# Patient Record
Sex: MALE | Race: OTHER | NOT HISPANIC OR LATINO | URBAN - METROPOLITAN AREA
[De-identification: names, ages, dates, MRNs, and addresses within clinical notes are randomized per-mention and may not be internally consistent; named-entity substitution may affect disease eponyms.]

---

## 2018-12-17 ENCOUNTER — INPATIENT (INPATIENT)
Facility: HOSPITAL | Age: 83
LOS: 0 days | Discharge: LEFT BEFORE DISCHARGE | DRG: 204 | End: 2018-12-17
Attending: INTERNAL MEDICINE | Admitting: INTERNAL MEDICINE
Payer: MEDICARE

## 2018-12-17 VITALS
OXYGEN SATURATION: 96 % | HEART RATE: 49 BPM | SYSTOLIC BLOOD PRESSURE: 154 MMHG | TEMPERATURE: 98 F | DIASTOLIC BLOOD PRESSURE: 67 MMHG | RESPIRATION RATE: 19 BRPM

## 2018-12-17 VITALS
SYSTOLIC BLOOD PRESSURE: 194 MMHG | WEIGHT: 143.08 LBS | HEART RATE: 57 BPM | RESPIRATION RATE: 19 BRPM | TEMPERATURE: 98 F | OXYGEN SATURATION: 95 % | DIASTOLIC BLOOD PRESSURE: 68 MMHG

## 2018-12-17 PROCEDURE — 99284 EMERGENCY DEPT VISIT MOD MDM: CPT | Mod: 25

## 2018-12-17 PROCEDURE — 71275 CT ANGIOGRAPHY CHEST: CPT

## 2018-12-17 PROCEDURE — 85610 PROTHROMBIN TIME: CPT

## 2018-12-17 PROCEDURE — 84484 ASSAY OF TROPONIN QUANT: CPT

## 2018-12-17 PROCEDURE — 36415 COLL VENOUS BLD VENIPUNCTURE: CPT

## 2018-12-17 PROCEDURE — 80053 COMPREHEN METABOLIC PANEL: CPT

## 2018-12-17 PROCEDURE — 71046 X-RAY EXAM CHEST 2 VIEWS: CPT

## 2018-12-17 PROCEDURE — 71046 X-RAY EXAM CHEST 2 VIEWS: CPT | Mod: 26

## 2018-12-17 PROCEDURE — 85730 THROMBOPLASTIN TIME PARTIAL: CPT

## 2018-12-17 PROCEDURE — 99285 EMERGENCY DEPT VISIT HI MDM: CPT | Mod: 25

## 2018-12-17 PROCEDURE — 83880 ASSAY OF NATRIURETIC PEPTIDE: CPT

## 2018-12-17 PROCEDURE — 82553 CREATINE MB FRACTION: CPT

## 2018-12-17 PROCEDURE — 85025 COMPLETE CBC W/AUTO DIFF WBC: CPT

## 2018-12-17 PROCEDURE — 82550 ASSAY OF CK (CPK): CPT

## 2018-12-17 PROCEDURE — 71275 CT ANGIOGRAPHY CHEST: CPT | Mod: 26

## 2018-12-17 NOTE — ED PROVIDER NOTE - MEDICAL DECISION MAKING DETAILS
Pt c/o progressive kingston, poss pnd, + bilat le edema x years, worse lately w minimal exertion.  No murmur on exam.  No med care x years so unclear if pt has cardiac vs pulm etiology of sx.  Plan labs, cxr, cta for pe since h/o skin ca, reassess.

## 2018-12-17 NOTE — ED PROVIDER NOTE - PROGRESS NOTE DETAILS
Trop neg, bnp elevated, cxr neg on my read, prelim cta neg for pe per verbal report.  Pt discussed w Dr Nicholson - will admit to tele for kingston eval. CTA neg for pe and + for copd.  Pt unwilling to stay - understands risk of undiagnosed cardiac or pulm issues.  Patient desires to leave emergency department against medical advice, prior to completion of my desired evaluation and treatment plan.  Patient's mental status is normal, patient is fully alert and oriented x person, place and time.  Patient demonstrates clear reasoning capabilities and capacity to make this decision.  Patient fully understands the explained risks involved with this decision including worsening of medical condition, missed and or delayed diagnosis, permanent disability and death.  All alternative options given to patient and still desires discharge against medical advice from ED and will follow up as an outpatient.  Patient given the option to return to ED at any time to have further evaluation and treatment. Referrals coordinator taking his info to help arrange pmd, cardiac and pulm outpt care.

## 2018-12-17 NOTE — ED PROVIDER NOTE - OBJECTIVE STATEMENT
84 yo male w/o past medical care and unknown pmh c/o progressive kingston w minimal exertion now causing sob.  No cp, palpitations.  + pnd, no orthopnea.  + le edema x years - no sig change lately.  No recent travel, h/o pe/dvt.  No cardiac or pulm eval. + tob history.  No cough, uri sx, fever, chills.

## 2018-12-17 NOTE — ED PROVIDER NOTE - DIAGNOSTIC INTERPRETATION
ER Physician:  Debbi Director  CHEST XRAY INTERPRETATION: lungs clear, heart shadow normal, bony structures intact

## 2018-12-17 NOTE — ED ADULT TRIAGE NOTE - CHIEF COMPLAINT QUOTE
worsening shortness of breath, cough, chest pressure x few weeks.  Sent by Ariana Schuler NP ([phone]) worsening shortness of breath, cough, chest pressure x few weeks.  Sent by Ariana Schuler NP ([phone]).  Hx skin cancer

## 2018-12-17 NOTE — ED ADULT NURSE NOTE - CHIEF COMPLAINT QUOTE
worsening shortness of breath, cough, chest pressure x few weeks.  Sent by Ariana Schuler NP ([phone]).  Hx skin cancer

## 2018-12-17 NOTE — ED ADULT NURSE NOTE - NSIMPLEMENTINTERV_GEN_ALL_ED
Implemented All Universal Safety Interventions:  [place] to call system. Call bell, personal items and telephone within reach. Instruct patient to call for assistance. Room bathroom lighting operational. Non-slip footwear when patient is off stretcher. Physically safe environment: no spills, clutter or unnecessary equipment. Stretcher in lowest position, wheels locked, appropriate side rails in place.

## 2018-12-17 NOTE — ED PROVIDER NOTE - NSFOLLOWUPINSTRUCTIONS_ED_ALL_ED_FT
Exertional Shortness of Breath    Please follow up with a PMD, cardiologist and pulmonologist.  Return for increased difficulty breathing, chest pain, palpitations, any other concerns.    Shortness of breath    Shortness of breath (dyspnea) means you have trouble breathing and could indicate a medical problem. Causes include lung disease, heart disease, low amount of red blood cells (anemia), poor physical fitness, being overweight, smoking, etc. Your health care provider today may not be able to find a cause for your shortness of breath after your exam. In this case, it is important to have a follow-up exam with your primary care physician as instructed. If medicines were prescribed, take them as directed for the full length of time directed. Refrain from tobacco products.    SEEK IMMEDIATE MEDICAL CARE IF YOU HAVE ANY OF THE FOLLOWING SYMPTOMS: worsening shortness of breath, chest pain, back pain, abdominal pain, fever, coughing up blood, lightheadedness/dizziness.

## 2018-12-17 NOTE — ED ADULT NURSE NOTE - OBJECTIVE STATEMENT
c.o cough with clear sputum and worsening sob for 3 years. denies any chest pain, fever/chills, dizziness. states he is only able to walk 3 blocks before feeling sob. denies any sob at this time. speaking in clear, complete sentences

## 2018-12-17 NOTE — ED PROVIDER NOTE - MUSCULOSKELETAL, MLM
Spine appears normal, range of motion is not limited, no muscle or joint tenderness, 3+ bilat le edema

## 2018-12-19 DIAGNOSIS — J44.9 CHRONIC OBSTRUCTIVE PULMONARY DISEASE, UNSPECIFIED: ICD-10-CM

## 2018-12-19 DIAGNOSIS — R06.00 DYSPNEA, UNSPECIFIED: ICD-10-CM

## 2019-12-18 NOTE — ED ADULT NURSE NOTE - CHIEF COMPLAINT
Is This A New Presentation, Or A Follow-Up?: Skin Lesions How Severe Is Your Skin Lesion?: mild Have Your Skin Lesions Been Treated?: not been treated The patient is a 83y Male complaining of shortness of breath.

## 2023-04-15 ENCOUNTER — INPATIENT (INPATIENT)
Facility: HOSPITAL | Age: 88
LOS: 1 days | Discharge: NURSING, REHAB OR LONG-TERM CARE | DRG: 190 | End: 2023-04-17
Attending: STUDENT IN AN ORGANIZED HEALTH CARE EDUCATION/TRAINING PROGRAM | Admitting: STUDENT IN AN ORGANIZED HEALTH CARE EDUCATION/TRAINING PROGRAM
Payer: MEDICARE

## 2023-04-15 VITALS
RESPIRATION RATE: 20 BRPM | SYSTOLIC BLOOD PRESSURE: 110 MMHG | DIASTOLIC BLOOD PRESSURE: 66 MMHG | OXYGEN SATURATION: 92 % | HEART RATE: 88 BPM | TEMPERATURE: 101 F

## 2023-04-15 DIAGNOSIS — R09.02 HYPOXEMIA: ICD-10-CM

## 2023-04-15 DIAGNOSIS — Z95.0 PRESENCE OF CARDIAC PACEMAKER: ICD-10-CM

## 2023-04-15 DIAGNOSIS — J44.1 CHRONIC OBSTRUCTIVE PULMONARY DISEASE WITH (ACUTE) EXACERBATION: ICD-10-CM

## 2023-04-15 DIAGNOSIS — J18.9 PNEUMONIA, UNSPECIFIED ORGANISM: ICD-10-CM

## 2023-04-15 DIAGNOSIS — Z98.890 OTHER SPECIFIED POSTPROCEDURAL STATES: Chronic | ICD-10-CM

## 2023-04-15 DIAGNOSIS — Z29.9 ENCOUNTER FOR PROPHYLACTIC MEASURES, UNSPECIFIED: ICD-10-CM

## 2023-04-15 DIAGNOSIS — I50.22 CHRONIC SYSTOLIC (CONGESTIVE) HEART FAILURE: ICD-10-CM

## 2023-04-15 LAB
ACETONE SERPL-MCNC: NEGATIVE — SIGNIFICANT CHANGE UP
ALBUMIN SERPL ELPH-MCNC: 2.9 G/DL — LOW (ref 3.5–5)
ALP SERPL-CCNC: 121 U/L — HIGH (ref 40–120)
ALT FLD-CCNC: 74 U/L DA — HIGH (ref 10–60)
ANION GAP SERPL CALC-SCNC: 0 MMOL/L — LOW (ref 5–17)
ANISOCYTOSIS BLD QL: SLIGHT — SIGNIFICANT CHANGE UP
APPEARANCE UR: CLEAR — SIGNIFICANT CHANGE UP
AST SERPL-CCNC: 37 U/L — SIGNIFICANT CHANGE UP (ref 10–40)
BACTERIA # UR AUTO: ABNORMAL /HPF
BASE EXCESS BLDA CALC-SCNC: 12.4 MMOL/L — HIGH (ref -2–3)
BASOPHILS # BLD AUTO: 0.01 K/UL — SIGNIFICANT CHANGE UP (ref 0–0.2)
BASOPHILS NFR BLD AUTO: 0.1 % — SIGNIFICANT CHANGE UP (ref 0–2)
BILIRUB SERPL-MCNC: 0.8 MG/DL — SIGNIFICANT CHANGE UP (ref 0.2–1.2)
BILIRUB UR-MCNC: NEGATIVE — SIGNIFICANT CHANGE UP
BLOOD GAS COMMENTS ARTERIAL: SIGNIFICANT CHANGE UP
BUN SERPL-MCNC: 28 MG/DL — HIGH (ref 7–18)
CALCIUM SERPL-MCNC: 9.8 MG/DL — SIGNIFICANT CHANGE UP (ref 8.4–10.5)
CHLORIDE SERPL-SCNC: 98 MMOL/L — SIGNIFICANT CHANGE UP (ref 96–108)
CK MB BLD-MCNC: 2.1 % — SIGNIFICANT CHANGE UP (ref 0–3.5)
CK MB CFR SERPL CALC: 1.4 NG/ML — SIGNIFICANT CHANGE UP (ref 0–3.6)
CK SERPL-CCNC: 67 U/L — SIGNIFICANT CHANGE UP (ref 35–232)
CO2 SERPL-SCNC: 36 MMOL/L — HIGH (ref 22–31)
COLOR SPEC: YELLOW — SIGNIFICANT CHANGE UP
CREAT SERPL-MCNC: 0.99 MG/DL — SIGNIFICANT CHANGE UP (ref 0.5–1.3)
DIFF PNL FLD: ABNORMAL
EGFR: 74 ML/MIN/1.73M2 — SIGNIFICANT CHANGE UP
EOSINOPHIL # BLD AUTO: 0.08 K/UL — SIGNIFICANT CHANGE UP (ref 0–0.5)
EOSINOPHIL NFR BLD AUTO: 0.8 % — SIGNIFICANT CHANGE UP (ref 0–6)
GLUCOSE SERPL-MCNC: 155 MG/DL — HIGH (ref 70–99)
GLUCOSE UR QL: NEGATIVE — SIGNIFICANT CHANGE UP
HCO3 BLDA-SCNC: 36 MMOL/L — HIGH (ref 21–28)
HCT VFR BLD CALC: 36.8 % — LOW (ref 39–50)
HGB BLD-MCNC: 12.4 G/DL — LOW (ref 13–17)
HIV 1 & 2 AB SERPL IA.RAPID: SIGNIFICANT CHANGE UP
HOROWITZ INDEX BLDA+IHG-RTO: 32 — SIGNIFICANT CHANGE UP
HYPOCHROMIA BLD QL: SLIGHT — SIGNIFICANT CHANGE UP
IMM GRANULOCYTES NFR BLD AUTO: 0.4 % — SIGNIFICANT CHANGE UP (ref 0–0.9)
KETONES UR-MCNC: ABNORMAL
LACTATE SERPL-SCNC: 1.3 MMOL/L — SIGNIFICANT CHANGE UP (ref 0.7–2)
LEUKOCYTE ESTERASE UR-ACNC: NEGATIVE — SIGNIFICANT CHANGE UP
LIDOCAIN IGE QN: 104 U/L — SIGNIFICANT CHANGE UP (ref 73–393)
LYMPHOCYTES # BLD AUTO: 0.29 K/UL — LOW (ref 1–3.3)
LYMPHOCYTES # BLD AUTO: 3 % — LOW (ref 13–44)
MACROCYTES BLD QL: SLIGHT — SIGNIFICANT CHANGE UP
MAGNESIUM SERPL-MCNC: 2.2 MG/DL — SIGNIFICANT CHANGE UP (ref 1.6–2.6)
MANUAL SMEAR VERIFICATION: SIGNIFICANT CHANGE UP
MCHC RBC-ENTMCNC: 32 PG — SIGNIFICANT CHANGE UP (ref 27–34)
MCHC RBC-ENTMCNC: 33.7 GM/DL — SIGNIFICANT CHANGE UP (ref 32–36)
MCV RBC AUTO: 95.1 FL — SIGNIFICANT CHANGE UP (ref 80–100)
MONOCYTES # BLD AUTO: 0.4 K/UL — SIGNIFICANT CHANGE UP (ref 0–0.9)
MONOCYTES NFR BLD AUTO: 4.1 % — SIGNIFICANT CHANGE UP (ref 2–14)
NEUTROPHILS # BLD AUTO: 8.96 K/UL — HIGH (ref 1.8–7.4)
NEUTROPHILS NFR BLD AUTO: 91.6 % — HIGH (ref 43–77)
NITRITE UR-MCNC: NEGATIVE — SIGNIFICANT CHANGE UP
NRBC # BLD: 0 /100 WBCS — SIGNIFICANT CHANGE UP (ref 0–0)
NT-PROBNP SERPL-SCNC: 456 PG/ML — HIGH (ref 0–450)
OSMOLALITY SERPL: 294 MOSMOL/KG — SIGNIFICANT CHANGE UP (ref 280–301)
PCO2 BLDA: 40 MMHG — SIGNIFICANT CHANGE UP (ref 35–48)
PH BLDA: 7.56 — HIGH (ref 7.35–7.45)
PH UR: 6.5 — SIGNIFICANT CHANGE UP (ref 5–8)
PLAT MORPH BLD: NORMAL — SIGNIFICANT CHANGE UP
PLATELET # BLD AUTO: 266 K/UL — SIGNIFICANT CHANGE UP (ref 150–400)
PLATELET COUNT - ESTIMATE: NORMAL — SIGNIFICANT CHANGE UP
PO2 BLDA: 73 MMHG — LOW (ref 83–108)
POTASSIUM SERPL-MCNC: 4.5 MMOL/L — SIGNIFICANT CHANGE UP (ref 3.5–5.3)
POTASSIUM SERPL-SCNC: 4.5 MMOL/L — SIGNIFICANT CHANGE UP (ref 3.5–5.3)
PROT SERPL-MCNC: 7.9 G/DL — SIGNIFICANT CHANGE UP (ref 6–8.3)
PROT UR-MCNC: 30 MG/DL
RAPID RVP RESULT: SIGNIFICANT CHANGE UP
RBC # BLD: 3.87 M/UL — LOW (ref 4.2–5.8)
RBC # FLD: 14.2 % — SIGNIFICANT CHANGE UP (ref 10.3–14.5)
RBC BLD AUTO: ABNORMAL
RBC CASTS # UR COMP ASSIST: ABNORMAL /HPF (ref 0–2)
SAO2 % BLDA: 98 % — SIGNIFICANT CHANGE UP
SARS-COV-2 RNA SPEC QL NAA+PROBE: SIGNIFICANT CHANGE UP
SODIUM SERPL-SCNC: 134 MMOL/L — LOW (ref 135–145)
SP GR SPEC: 1.01 — SIGNIFICANT CHANGE UP (ref 1.01–1.02)
TROPONIN I, HIGH SENSITIVITY RESULT: 10.6 NG/L — SIGNIFICANT CHANGE UP
TROPONIN I, HIGH SENSITIVITY RESULT: 14 NG/L — SIGNIFICANT CHANGE UP
UROBILINOGEN FLD QL: NEGATIVE — SIGNIFICANT CHANGE UP
WBC # BLD: 9.78 K/UL — SIGNIFICANT CHANGE UP (ref 3.8–10.5)
WBC # FLD AUTO: 9.78 K/UL — SIGNIFICANT CHANGE UP (ref 3.8–10.5)
WBC UR QL: SIGNIFICANT CHANGE UP /HPF (ref 0–5)

## 2023-04-15 PROCEDURE — 99223 1ST HOSP IP/OBS HIGH 75: CPT | Mod: GC,AI

## 2023-04-15 PROCEDURE — 71045 X-RAY EXAM CHEST 1 VIEW: CPT | Mod: 26

## 2023-04-15 PROCEDURE — 99291 CRITICAL CARE FIRST HOUR: CPT

## 2023-04-15 RX ORDER — ACETAMINOPHEN 500 MG
650 TABLET ORAL EVERY 6 HOURS
Refills: 0 | Status: DISCONTINUED | OUTPATIENT
Start: 2023-04-15 | End: 2023-04-17

## 2023-04-15 RX ORDER — FUROSEMIDE 40 MG
40 TABLET ORAL DAILY
Refills: 0 | Status: DISCONTINUED | OUTPATIENT
Start: 2023-04-15 | End: 2023-04-17

## 2023-04-15 RX ORDER — AZTREONAM 2 G
1000 VIAL (EA) INJECTION ONCE
Refills: 0 | Status: COMPLETED | OUTPATIENT
Start: 2023-04-15 | End: 2023-04-15

## 2023-04-15 RX ORDER — ALBUTEROL 90 UG/1
2 AEROSOL, METERED ORAL EVERY 6 HOURS
Refills: 0 | Status: DISCONTINUED | OUTPATIENT
Start: 2023-04-15 | End: 2023-04-17

## 2023-04-15 RX ORDER — TIOTROPIUM BROMIDE 18 UG/1
2 CAPSULE ORAL; RESPIRATORY (INHALATION) DAILY
Refills: 0 | Status: DISCONTINUED | OUTPATIENT
Start: 2023-04-15 | End: 2023-04-17

## 2023-04-15 RX ORDER — ENOXAPARIN SODIUM 100 MG/ML
40 INJECTION SUBCUTANEOUS EVERY 24 HOURS
Refills: 0 | Status: DISCONTINUED | OUTPATIENT
Start: 2023-04-15 | End: 2023-04-17

## 2023-04-15 RX ORDER — CEFTRIAXONE 500 MG/1
1000 INJECTION, POWDER, FOR SOLUTION INTRAMUSCULAR; INTRAVENOUS EVERY 24 HOURS
Refills: 0 | Status: DISCONTINUED | OUTPATIENT
Start: 2023-04-15 | End: 2023-04-17

## 2023-04-15 RX ORDER — ISOSORBIDE MONONITRATE 60 MG/1
60 TABLET, EXTENDED RELEASE ORAL DAILY
Refills: 0 | Status: DISCONTINUED | OUTPATIENT
Start: 2023-04-15 | End: 2023-04-17

## 2023-04-15 RX ORDER — IPRATROPIUM/ALBUTEROL SULFATE 18-103MCG
3 AEROSOL WITH ADAPTER (GRAM) INHALATION EVERY 6 HOURS
Refills: 0 | Status: DISCONTINUED | OUTPATIENT
Start: 2023-04-15 | End: 2023-04-17

## 2023-04-15 RX ORDER — LIDOCAINE 4 G/100G
1 CREAM TOPICAL DAILY
Refills: 0 | Status: DISCONTINUED | OUTPATIENT
Start: 2023-04-15 | End: 2023-04-17

## 2023-04-15 RX ORDER — SALICYLIC ACID 0.5 %
1 CLEANSER (GRAM) TOPICAL
Refills: 0 | Status: DISCONTINUED | OUTPATIENT
Start: 2023-04-15 | End: 2023-04-17

## 2023-04-15 RX ORDER — ASPIRIN/CALCIUM CARB/MAGNESIUM 324 MG
162 TABLET ORAL ONCE
Refills: 0 | Status: COMPLETED | OUTPATIENT
Start: 2023-04-15 | End: 2023-04-15

## 2023-04-15 RX ORDER — ACETAMINOPHEN 500 MG
1000 TABLET ORAL ONCE
Refills: 0 | Status: COMPLETED | OUTPATIENT
Start: 2023-04-15 | End: 2023-04-15

## 2023-04-15 RX ORDER — SOD,AMMONIUM,POTASSIUM LACTATE
1 CREAM (GRAM) TOPICAL
Refills: 0 | Status: DISCONTINUED | OUTPATIENT
Start: 2023-04-15 | End: 2023-04-17

## 2023-04-15 RX ORDER — ATORVASTATIN CALCIUM 80 MG/1
40 TABLET, FILM COATED ORAL AT BEDTIME
Refills: 0 | Status: DISCONTINUED | OUTPATIENT
Start: 2023-04-15 | End: 2023-04-17

## 2023-04-15 RX ORDER — PANTOPRAZOLE SODIUM 20 MG/1
40 TABLET, DELAYED RELEASE ORAL
Refills: 0 | Status: DISCONTINUED | OUTPATIENT
Start: 2023-04-15 | End: 2023-04-17

## 2023-04-15 RX ORDER — AZITHROMYCIN 500 MG/1
500 TABLET, FILM COATED ORAL EVERY 24 HOURS
Refills: 0 | Status: DISCONTINUED | OUTPATIENT
Start: 2023-04-15 | End: 2023-04-17

## 2023-04-15 RX ORDER — PIPERACILLIN AND TAZOBACTAM 4; .5 G/20ML; G/20ML
3.38 INJECTION, POWDER, LYOPHILIZED, FOR SOLUTION INTRAVENOUS ONCE
Refills: 0 | Status: COMPLETED | OUTPATIENT
Start: 2023-04-15 | End: 2023-04-15

## 2023-04-15 RX ORDER — IPRATROPIUM/ALBUTEROL SULFATE 18-103MCG
3 AEROSOL WITH ADAPTER (GRAM) INHALATION
Refills: 0 | Status: COMPLETED | OUTPATIENT
Start: 2023-04-15 | End: 2023-04-15

## 2023-04-15 RX ADMIN — Medication 3 MILLILITER(S): at 14:32

## 2023-04-15 RX ADMIN — Medication 3 MILLILITER(S): at 14:15

## 2023-04-15 RX ADMIN — Medication 80 MILLIGRAM(S): at 15:24

## 2023-04-15 RX ADMIN — Medication 400 MILLIGRAM(S): at 21:00

## 2023-04-15 RX ADMIN — Medication 50 MILLIGRAM(S): at 16:03

## 2023-04-15 RX ADMIN — Medication 3 MILLILITER(S): at 15:26

## 2023-04-15 RX ADMIN — Medication 162 MILLIGRAM(S): at 15:25

## 2023-04-15 RX ADMIN — PIPERACILLIN AND TAZOBACTAM 200 GRAM(S): 4; .5 INJECTION, POWDER, LYOPHILIZED, FOR SOLUTION INTRAVENOUS at 15:26

## 2023-04-15 RX ADMIN — Medication 1000 MILLIGRAM(S): at 21:30

## 2023-04-15 NOTE — ED PROVIDER NOTE - CARE PLAN
1 Principal Discharge DX:	Hypoxemia  Secondary Diagnosis:	COPD exacerbation  Secondary Diagnosis:	PNA (pneumonia)

## 2023-04-15 NOTE — ED PROVIDER NOTE - CONSTITUTIONAL MOOD
What Type Of Note Output Would You Prefer (Optional)?: Bullet Format
Hpi Title: Evaluation of Skin Lesions
How Severe Are Your Spot(S)?: mild
Have Your Spot(S) Been Treated In The Past?: has not been treated
appropriate

## 2023-04-15 NOTE — H&P ADULT - PROBLEM SELECTOR PLAN 1
h/o copd on home o2  O2 support as needed, maintain O2 sat 88-92%, avoid over oxygenation  p/w sob, cough  CXR shows LLL infiltrate  solumedrol 40q8, taper down  symbicort, albuterol prn  azithromycin + ceftriaxone  Incentive spirometer  Pulm consulted: Dr. Gaxiola h/o copd on home o2  O2 support as needed, maintain O2 sat 88-92%, avoid over oxygenation  p/w sob, cough  CXR shows LLL infiltrate  prednisone 40 mg daily  duoneb, albuterol prn, spiriva  azithromycin + ceftriaxone  Incentive spirometer  Pulm consulted: Dr. Gaxiola

## 2023-04-15 NOTE — H&P ADULT - ASSESSMENT
Patient is a 88 y/o M from [facility], ambulates with a cane. He has PMHx of COPD on 2LPM O2 via nasal cannula, PPM, Chronic systolic HF, PPM, PVD, lymphedema. Admitted for COPD exacerbation 2/2 pneumonia Patient is a 86 y/o M from [facility], ambulates with a cane. He has PMHx of COPD on 2LPM O2 via nasal cannula, PPM, Chronic systolic HF, PPM, PVD, lymphedema. Admitted for COPD exacerbation 2/2 pneumonia Patient is a 88 y/o M from [facility], ambulates with a cane. He has PMHx of COPD on 2LPM O2 via nasal cannula, PPM, Chronic systolic HF, PPM, PVD, lymphedema. Admitted for COPD exacerbation 2/2 pneumonia

## 2023-04-15 NOTE — ED PROVIDER NOTE - CLINICAL SUMMARY MEDICAL DECISION MAKING FREE TEXT BOX
pt with chest pain, SOB, concern for COPD exac., infectious process.  Pt also co CP. will get Trop., give treatment, poss admission

## 2023-04-15 NOTE — ED PROVIDER NOTE - NSICDXPASTMEDICALHX_GEN_ALL_CORE_FT
PAST MEDICAL HISTORY:  Cardiac pacemaker     H/O polyarthritis     History of COPD     Lymphedema

## 2023-04-15 NOTE — ED ADULT TRIAGE NOTE - NURSING HOMES
[facility]

## 2023-04-15 NOTE — H&P ADULT - NSHPSOCIALHISTORY_GEN_ALL_CORE
came from [facility]  walks with a cane  former smoker (quit several years ago)  non-alcoholic beverage drinker  no illicit drug use came from [facility]  walks with a cane  former smoker (quit several years ago)  non-alcoholic beverage drinker  no illicit drug use came from [facility]  walks with a cane  former smoker (quit several years ago)  non-alcoholic beverage drinker  no illicit drug use

## 2023-04-15 NOTE — ED ADULT NURSE NOTE - NSIMPLEMENTINTERV_GEN_ALL_ED
Implemented All Fall with Harm Risk Interventions:  [place] to call system. Call bell, personal items and telephone within reach. Instruct patient to call for assistance. Room bathroom lighting operational. Non-slip footwear when patient is off stretcher. Physically safe environment: no spills, clutter or unnecessary equipment. Stretcher in lowest position, wheels locked, appropriate side rails in place. Provide visual cue, wrist band, yellow gown, etc. Monitor gait and stability. Monitor for mental status changes and reorient to person, place, and time. Review medications for side effects contributing to fall risk. Reinforce activity limits and safety measures with patient and family. Provide visual clues: red socks. Implemented All Fall with Harm Risk Interventions:  [place] to call system. Call bell, personal items and telephone within reach. Instruct patient to call for assistance. Room bathroom lighting operational. Non-slip footwear when patient is off stretcher. Physically safe environment: no spills, clutter or unnecessary equipment. Stretcher in lowest position, wheels locked, appropriate side rails in place. Provide visual cue, wrist band, yellow gown, etc. Monitor gait and stability. Monitor for mental status changes and reorient to person, place, and time. Review medications for side effects contributing to fall risk. Reinforce activity limits and safety measures with patient and family. Provide visual clues: red socks. Implemented All Fall with Harm Risk Interventions:  [place] to call system. Call bell, personal items and telephone within reach. Instruct patient to call for assistance. Room bathroom lighting operational. Non-slip footwear when patient is off stretcher. Physically safe environment: no spills, clutter or unnecessary equipment. Stretcher in lowest position, wheels locked, appropriate side rails in place. Provide visual cue, wrist band, yellow gown, etc. Monitor gait and stability. Monitor for mental status changes and reorient to person, place, and time. Review medications for side effects contributing to fall risk. Reinforce activity limits and safety measures with patient and family. Provide visual clues: red socks.

## 2023-04-15 NOTE — H&P ADULT - ATTENDING COMMENTS
87M with PMHx of COPD (apparently on 2 L home O2), chronic systolic HF, and venous stasis presenting for several day history of productive cough, left mid-axillary pain, and shortness of breath at rest. Patient is a poor historian. States his main issue is bilateral feet pain which has been chronic. Per ED attending patient with visible respiratory distress and wheezing on arrival. Improved with DuoNebs and steroids. Also loaded with aspirin for possible cardiac etiology, though left mid-axillary pain is reproducible and likely MSK in etiology.     VS: low grade fever 100.7, on 4-5 L O2  coarse breath sounds throughout  +venous stasis bilateral legs feet to knees  appears euvolemic    Labs personally reviewed  AB.  Trop neg  pBNP 456    CXR personally reviewed by me: possible LLL infiltrate?    A/P:  Possible COPD exacerbation with underlying PNA. As stated above will treat for CAP (from the community, though lives in a communal setting). Treat for COPD exacerbation as well with short course of steroids and ATC DuoNebs. Maintain Lasix to maintain euvolemia. Wean O2 back to home usage. Monitor clinically.

## 2023-04-15 NOTE — ED PROVIDER NOTE - MUSCULOSKELETAL, MLM
Spine appears kyphotic, range of motion is not limited, no muscle or joint tenderness, danie legs-venous stasis, varicose veins

## 2023-04-15 NOTE — H&P ADULT - NSHPREVIEWOFSYSTEMS_GEN_ALL_CORE
- CONSTITUTIONAL: Denies fever and chills  - HEENT: Denies changes in vision and hearing.  - RESPIRATORY: (+) SOB and cough.  - CV: (+) chest pain and palpitations  - GI: Denies abdominal pain, nausea, vomiting and diarrhea.  - : Denies dysuria and urinary frequency.  - SKIN: Denies rash and pruritus.  - NEUROLOGICAL: Denies headache and syncope.  - EXTREMITIES: (+) leg pain  - PSYCHIATRIC: Denies recent changes in mood. Denies anxiety and depression.

## 2023-04-15 NOTE — H&P ADULT - HISTORY OF PRESENT ILLNESS
Patient is a 88 y/o M from [facility], ambulates with a cane. He has PMHx of COPD on 2LPM O2 via nasal cannula, PPM, Chronic systolic HF, PPM, PVD, lymphedema. He presented with worsening shortness of breath with associated non-productive cough and whitish sputum. He also reported intermittent left sided chest pain radiating to his left axilla and palpitations. He denies any fever, headache, dizziness, abdominal pain, diarrhea, constipation. In the ED, VS was /66, HR 88, RR 20, T 100.7 (38.2), O2 92% on 2LPM via nasal cannula. Labs were unremarkable. Troponin was negatvie. EKG showed atrial-sensed ventricular paced-rhythm. As per patient, PPM was last checked 3-4 months ago. He was given ASA, Zosyn and Aztreonam. Chest pain has resolved at the time of examination.    GOC: DNR/ TRIAL OF INTUBATION Patient is a 86 y/o M from [facility], ambulates with a cane. He has PMHx of COPD on 2LPM O2 via nasal cannula, PPM, Chronic systolic HF, PPM, PVD, lymphedema. He presented with worsening shortness of breath with associated non-productive cough and whitish sputum. He also reported intermittent left sided chest pain radiating to his left axilla and palpitations. He denies any fever, headache, dizziness, abdominal pain, diarrhea, constipation. In the ED, VS was /66, HR 88, RR 20, T 100.7 (38.2), O2 92% on 2LPM via nasal cannula. Labs were unremarkable. Troponin was negatvie. EKG showed atrial-sensed ventricular paced-rhythm. As per patient, PPM was last checked 3-4 months ago. He was given ASA, Zosyn and Aztreonam. Chest pain has resolved at the time of examination.    GOC: DNR/ TRIAL OF INTUBATION Patient is a 88 y/o M from [facility], ambulates with a cane. He has PMHx of COPD on 2LPM O2 via nasal cannula, PPM, Chronic systolic HF, PPM, PVD, lymphedema. He presented with worsening shortness of breath with associated non-productive cough and whitish sputum. He also reported intermittent left sided chest pain radiating to his left axilla and palpitations. He denies any fever, headache, dizziness, abdominal pain, diarrhea, constipation. In the ED, VS was /66, HR 88, RR 20, T 100.7 (38.2), O2 92% on 2LPM via nasal cannula. Labs were unremarkable. Troponin was negatvie. EKG showed atrial-sensed ventricular paced-rhythm. As per patient, PPM was last checked 3-4 months ago. He was given ASA, Zosyn and Aztreonam. Chest pain has resolved at the time of examination.    GOC: DNR/ TRIAL OF INTUBATION

## 2023-04-15 NOTE — H&P ADULT - CONVERSATION DETAILS
asked patient regarding GOC  wants to be DNR but trial of intubation  amenable to tracheostomy when needed  MOLST form signed

## 2023-04-15 NOTE — ED ADULT NURSE NOTE - NSFALLRSKOUTCOME_ED_ALL_ED
Chief Complaint   Patient presents with     Recheck Medication     MDD     Reviewed allergies, medications, pharmacy and smoking status.  Administered abuse screening questions     Obtained weight, pain level,   blood pressure and heart rate x2       Fall with Harm Risk

## 2023-04-15 NOTE — H&P ADULT - NSHPPHYSICALEXAM_GEN_ALL_CORE
Vital Signs  · BP - 110/66 mm Hg  · Heart Rate - 88 /min  · Respiration Rate - 20 /min  · Temp - 100.7 F (38.2 C)  · SpO2 (%)	 - 92 %  · O2 Delivery/Oxygen Delivery Method - nasal cannula  · Oxygen Therapy Flow (L/min) - 2 L/min    PHYSICAL EXAM:  GENERAL: NAD, speaks in full sentences, no signs of respiratory distress  HEAD:  Atraumatic, Normocephalic  EYES: EOMI, PERRLA, conjunctiva and sclera clear  NECK: Supple, No JVD  CHEST/LUNG: Clear to auscultation bilaterally; No wheeze; No crackles; No accessory muscles used  HEART: Regular rate and rhythm; No murmurs;   ABDOMEN: Soft, Nontender, Nondistended; Bowel sounds present; No guarding  EXTREMITIES:  2+ Peripheral Pulses, No cyanosis or edema  PSYCH: AAOx3  NEUROLOGY: non-focal  SKIN: No rashes or lesions Vital Signs  · BP - 110/66 mm Hg  · Heart Rate - 88 /min  · Respiration Rate - 20 /min  · Temp - 100.7 F (38.2 C)  · SpO2 (%)	 - 92 %  · O2 Delivery/Oxygen Delivery Method - nasal cannula  · Oxygen Therapy Flow (L/min) - 2 L/min    PHYSICAL EXAM:  GENERAL: NAD, speaks in full sentences, no signs of respiratory distress  HEAD:  Atraumatic, Normocephalic  EYES: EOMI, PERRLA, conjunctiva and sclera clear  NECK: Supple, No JVD  CHEST/LUNG:(+) rhonchi, (+) decreased breath sounds on both lung bases; No accessory muscles used  HEART: Regular rate and rhythm; No murmurs;   ABDOMEN: Soft, Nontender, Nondistended; Bowel sounds present; No guarding  EXTREMITIES:  2+ Peripheral Pulses, No cyanosis or edema  PSYCH: AAOx3  NEUROLOGY: non-focal  SKIN: (+) chronic venous stasis; (+) necrotic lesions over R side of nose; L upper cheek

## 2023-04-15 NOTE — ED PROVIDER NOTE - PROGRESS NOTE DETAILS
Labs/CXR explained to pt  Pt with COPD exac., PNA, willl admit pt.  Case d/w SHANNNO Davenport Labs/CXR explained to pt  Pt with COPD exac., PNA, willl admit pt.  Case d/w SHANNON Davenport

## 2023-04-15 NOTE — ED PROVIDER NOTE - OBJECTIVE STATEMENT
87 y.o. [facility] resident with h/o COPD on O2 on & off BIBA c/o on & off SSCP for past mos., radiated to lower chest & Lt shoulder, assoc with sob, palpitations, chronic lightheadedness/cough.  No fever, chills,

## 2023-04-15 NOTE — ED ADULT NURSE NOTE - PAIN: DESCRIPTION (FREQUENCY/QUALITY)
"    2/25/2021         RE: Rupinder Peters  [address]  [place] [address]        Dear Colleague,    Thank you for referring your patient, Rupinder Peters, to the [facility]. Please see a copy of my visit note below.    [facility]    Hematology/Oncology Established Patient Follow-up Note      Today's Date: 02/25/21    Reason for Follow-up: Bilateral breast cancer. Transfer of care.    Rupinder Peters is a 40 year old female who is being evaluated via a billable video visit.      The patient has been notified of following:     \"This video visit will be conducted via a call between you and your physician/provider. We have found that certain health care needs can be provided without the need for an in-person physical exam.  This service lets us provide the care you need with a video conversation during the COVID-19 pandemic.  If a prescription is necessary we can send it directly to your pharmacy.  If lab work is needed we can place an order for that and you can then stop by our lab to have the test done at a later time.    Video visits are billed at different rates depending on your insurance coverage.  Please reach out to your insurance provider with any questions.    If during the course of the call the physician/provider feels a video visit is not appropriate, you will not be charged for this service.\"    Patient has given verbal consent for Video visit? Yes    How would you like to obtain your AVS? Lashonda    Patient in virtual lobby          Video-Visit Details    Type of service:  Video Visit      Originating Location (pt. 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   --10/23/2014: Underwent bilateral mastectomies. Her left breast had a 2.8 cm invasive ductal carcinoma, grade 3/3 with 1 of 18 lymph nodes involved with 0.2 cm of tumor. 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 The tumor was ER positive at 95%, ID positive at 40%, and HER-2 positive IHC = 3+ in her left breast.   --1/13/2015-4/28/2015: She elected to be treated at the [facility] in [place], where she received 6 cycles of carboplatin, Taxotere, pertuzumab, and trastuzumab. She received vitamin C and glutathione at the [facility].  --1/5/2016: Completed 1 year of trastuzumab at [facility] with Dr. Alyson Grewal.   --8/11/2015: Started tamoxifen under care of Dr. Alyson Grewal.     She was tested negative for BRCA1/2 in 2015 and then retested positive in 8/2017, dup exon 19(3)-20.    INTERIM HISTORY:  Rupinder Peters reports tolerating tamoxifen well with no significant side effects.      REVIEW OF SYSTEMS:   14 point ROS was reviewed and is negative other than as noted above in HPI.       HOME MEDICATIONS:  Current Outpatient Medications   Medication Sig Dispense Refill     Ascorbic Acid (CHERYL-C PO) Take 1,000 mg by mouth 4 times daily       CALCIUM ASCORBATE PO        Calcium-Ergocalciferol (PARVA-SYLVIE) 250-100 MG-UNIT TABS Take 2 capsules by mouth       Cholecalciferol (VITAMIN D-3) 5000 units TABS Take 5,000 Int'l Units/day by mouth       L-LYSINE PO        NALTREXONE HCL PO Take 4.5 mg by mouth At Bedtime        NONFORMULARY Crucera by Kita: Sulfurophane ---- Pro Greens Probiotics (2nd probiotic) ---- Ultra Reishi Mushroom  --- Scute       NONFORMULARY Takes Xymogen T150 two a day--Xymogen Hormone protect two twice a day (DIM)--Xymogen calcium D-Gluconate two twice a day--Xymogen Liver Protect two twice a day--Xymogen Methyl Protect one once a day        NONFORMULARY Takes Nikki Astrugalus two a day       NONFORMULARY Takes Host Define Mycommunity two twice a day       NONFORMULARY Takes Nordic Naturals Fish oil       NONFORMULARY Takes Throne Q-Best 100mg daily       NONFORMULARY Takes Curcumin Essentials 1,500 daily       NONFORMULARY Takes Pure Magnesium Glycirate 2-3 caps       NONFORMULARY        NONFORMULARY        NONFORMULARY        RIBOFLAVIN PO        tamoxifen (NOLVADEX) 10 MG tablet Take 1 tablet (10 mg) by mouth 2 times daily 180 tablet 3     Ubiquinol 100 MG CAPS Take 1 capsule by mouth       zinc gluconate 50 MG tablet Take 50 mg by mouth           ALLERGIES:  Allergies   Allergen Reactions     Gluten Meal GI Disturbance         PAST MEDICAL HISTORY:  Past Medical History:   Diagnosis Date     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      SOCIAL HISTORY:  Social History     Socioeconomic History     Marital status:      Spouse name: Not on file     Number of children: 3     Years of education: Not on file     Highest education level: Not on file   Occupational History     Occupation: Marketing - [employer]     Employer: [employer]     Comment: part time   Social Needs     Financial resource strain: Not on file     Food insecurity     Worry: Not on file     Inability: Not on file     Transportation needs     Medical: Not on file     Non-medical: Not on file   Tobacco Use     Smoking status: Never Smoker     Smokeless tobacco: Never Used   Substance and Sexual Activity     Alcohol use: No     Drug use: No     Sexual activity: Yes     Partners: Male   Lifestyle     Physical activity     Days per week: Not on file     Minutes per session: Not on file     Stress: Not on file   Relationships     Social connections     Talks on phone: Not on file     Gets together: Not on file     Attends Confucianist service: Not on file     Active  member of club or organization: Not on file     Attends meetings of clubs or organizations: Not on file     Relationship status: Not on file     Intimate partner violence     Fear of current or ex partner: Not on file     Emotionally abused: Not on file     Physically abused: Not on file     Forced sexual activity: Not on file   Other Topics Concern     Parent/sibling w/ CABG, MI or angioplasty before 65F 55M? Not Asked   Social History Narrative     Not on file         FAMILY HISTORY:  Family History   Problem Relation Age of Onset     Breast Cancer Mother 33         at age 37 of breast cancer     Breast Cancer Maternal Grandmother 45         at 51     Prostate Cancer Paternal Uncle          of prostate cancer at age 62     Hereditary Breast and Ovarian Cancer Syndrome Cousin         BRCA2+     Breast Cancer Cousin 40     Breast Cancer Other 30        maternal great aunt     Prostate Cancer Father 67     Breast Cancer Sister 34     Prostate Cancer Maternal Grandfather 85         at 91     Other - See Comments Sister 30        breast reduction surgery     Prostate Cancer Paternal Grandfather 82         at 91     Prostate Cancer Maternal Uncle 59     Breast Cancer Maternal Cousin 41        first cousin     Hereditary Breast and Ovarian Cancer Syndrome Maternal Cousin         BRCA2+         PHYSICAL EXAM:  Vital signs:  There were no vitals taken for this visit.   ECO  GENERAL: No acute distress.  EYES: No scleral icterus. No overt erythema.  RESPIRATORY: No cough.  No labored breathing.  MUSCULOSKELETAL: Range of motion in the neck, shoulders, and arms appear normal.  SKIN: No overt rashes, discolorations, or lesions over the face and neck.  NEUROLOGIC: Alert.  No overt tremors.  PSYCHIATRIC: Normal affect and mood.  Does not appear anxious.    The rest of a comprehensive physical examination is deferred due to PHE (public health emergency) video visit restrictions.      LABS:  CBC RESULTS:    Recent Labs   Lab Test 09/14/20  1444   WBC 5.4   RBC 4.22   HGB 13.5   HCT 38.9   MCV 92   MCH 32.0   MCHC 34.7   RDW 13.1          Recent Labs   Lab Test 09/14/20  1444 12/06/19  1328    139   POTASSIUM 4.4 3.8   CHLORIDE 107 108   CO2 25 23   ANIONGAP 6 8   * 100*   BUN 13 11   CR 0.86 0.86   SYLVIE 8.9 8.8         PATHOLOGY:  Reviewed as per HPI.    IMAGING:  Reviewed as per HPI.    ASSESSMENT/PLAN:  Rupinder Peters is a 40 year old female with the following issues:  1. 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 Stage 0, pTis-N0-M0, grade 3 DCIS of right breast, ER and CO positive  3. BRCA-2 mutation  --Rupinder is status post bilateral mastectomies and currently on adjuvant tamoxifen.  --She will continue to see her gynecologist for surveillance pelvic U/S and CA-125 testing, last done 8/2020.  --She is seeing a functional MD in [place].  --Continue adjuvant tamoxifen 10 mg BID for total 10 years.  --Discussed consideration for prophylactic BSO given that she is at high risk for ovarian cancer with BRCA-2.  In meantime, continue pelvic U/S with her gynecologist.  --Discussed that her use of curcumin can inhibit tamoxifen and that I recommended against use of that supplement.  Will forward her the study to provide to her functional medicine provider.    4. Right lung nodule  --5 mm right lung apex nodule seen on CT in 11/6/2020.  --She is not a past or current smoker.  --Offered a 1-year interval chest CT for 11/2021.    5. Elevated CA-125  --She saw Dr. Simpson for discussion of this.   --Rupinder has not had any clinical evidence for ovarian cancer.  --She is done with child-bearing.  --She is contemplating prophylactic BSO over the next few years.  --She plans to see a benign gynecologist in April/May 2021.  --She will continue with every 6 months pelvic U/S.    Return in 6 months.    Jeannie Molina  MD  Hematology/Oncology  [facility]        Again, thank you for allowing me to participate in the care of your patient.        Sincerely,        Jeannie Molina MD     frequent

## 2023-04-16 LAB
ALBUMIN SERPL ELPH-MCNC: 2.4 G/DL — LOW (ref 3.5–5)
ALP SERPL-CCNC: 106 U/L — SIGNIFICANT CHANGE UP (ref 40–120)
ALT FLD-CCNC: 56 U/L DA — SIGNIFICANT CHANGE UP (ref 10–60)
ANION GAP SERPL CALC-SCNC: 7 MMOL/L — SIGNIFICANT CHANGE UP (ref 5–17)
AST SERPL-CCNC: 17 U/L — SIGNIFICANT CHANGE UP (ref 10–40)
BASOPHILS # BLD AUTO: 0.01 K/UL — SIGNIFICANT CHANGE UP (ref 0–0.2)
BASOPHILS NFR BLD AUTO: 0.1 % — SIGNIFICANT CHANGE UP (ref 0–2)
BILIRUB SERPL-MCNC: 0.6 MG/DL — SIGNIFICANT CHANGE UP (ref 0.2–1.2)
BUN SERPL-MCNC: 31 MG/DL — HIGH (ref 7–18)
CALCIUM SERPL-MCNC: 9.2 MG/DL — SIGNIFICANT CHANGE UP (ref 8.4–10.5)
CHLORIDE SERPL-SCNC: 97 MMOL/L — SIGNIFICANT CHANGE UP (ref 96–108)
CO2 SERPL-SCNC: 33 MMOL/L — HIGH (ref 22–31)
CREAT SERPL-MCNC: 0.96 MG/DL — SIGNIFICANT CHANGE UP (ref 0.5–1.3)
EGFR: 76 ML/MIN/1.73M2 — SIGNIFICANT CHANGE UP
EOSINOPHIL # BLD AUTO: 0 K/UL — SIGNIFICANT CHANGE UP (ref 0–0.5)
EOSINOPHIL NFR BLD AUTO: 0 % — SIGNIFICANT CHANGE UP (ref 0–6)
GLUCOSE SERPL-MCNC: 166 MG/DL — HIGH (ref 70–99)
HCT VFR BLD CALC: 34.6 % — LOW (ref 39–50)
HGB BLD-MCNC: 11.2 G/DL — LOW (ref 13–17)
IMM GRANULOCYTES NFR BLD AUTO: 0.3 % — SIGNIFICANT CHANGE UP (ref 0–0.9)
LEGIONELLA AG UR QL: NEGATIVE — SIGNIFICANT CHANGE UP
LYMPHOCYTES # BLD AUTO: 0.29 K/UL — LOW (ref 1–3.3)
LYMPHOCYTES # BLD AUTO: 3.8 % — LOW (ref 13–44)
MAGNESIUM SERPL-MCNC: 2.3 MG/DL — SIGNIFICANT CHANGE UP (ref 1.6–2.6)
MCHC RBC-ENTMCNC: 30.3 PG — SIGNIFICANT CHANGE UP (ref 27–34)
MCHC RBC-ENTMCNC: 32.4 GM/DL — SIGNIFICANT CHANGE UP (ref 32–36)
MCV RBC AUTO: 93.5 FL — SIGNIFICANT CHANGE UP (ref 80–100)
MONOCYTES # BLD AUTO: 0.17 K/UL — SIGNIFICANT CHANGE UP (ref 0–0.9)
MONOCYTES NFR BLD AUTO: 2.2 % — SIGNIFICANT CHANGE UP (ref 2–14)
NEUTROPHILS # BLD AUTO: 7.13 K/UL — SIGNIFICANT CHANGE UP (ref 1.8–7.4)
NEUTROPHILS NFR BLD AUTO: 93.6 % — HIGH (ref 43–77)
NRBC # BLD: 0 /100 WBCS — SIGNIFICANT CHANGE UP (ref 0–0)
PHOSPHATE SERPL-MCNC: 3.9 MG/DL — SIGNIFICANT CHANGE UP (ref 2.5–4.5)
PLATELET # BLD AUTO: 275 K/UL — SIGNIFICANT CHANGE UP (ref 150–400)
POTASSIUM SERPL-MCNC: 4.4 MMOL/L — SIGNIFICANT CHANGE UP (ref 3.5–5.3)
POTASSIUM SERPL-SCNC: 4.4 MMOL/L — SIGNIFICANT CHANGE UP (ref 3.5–5.3)
PROCALCITONIN SERPL-MCNC: 0.11 NG/ML — HIGH (ref 0.02–0.1)
PROT SERPL-MCNC: 7.2 G/DL — SIGNIFICANT CHANGE UP (ref 6–8.3)
RBC # BLD: 3.7 M/UL — LOW (ref 4.2–5.8)
RBC # FLD: 13.6 % — SIGNIFICANT CHANGE UP (ref 10.3–14.5)
S PNEUM AG UR QL: NEGATIVE — SIGNIFICANT CHANGE UP
SODIUM SERPL-SCNC: 137 MMOL/L — SIGNIFICANT CHANGE UP (ref 135–145)
WBC # BLD: 7.62 K/UL — SIGNIFICANT CHANGE UP (ref 3.8–10.5)
WBC # FLD AUTO: 7.62 K/UL — SIGNIFICANT CHANGE UP (ref 3.8–10.5)

## 2023-04-16 PROCEDURE — 99233 SBSQ HOSP IP/OBS HIGH 50: CPT

## 2023-04-16 PROCEDURE — 93280 PM DEVICE PROGR EVAL DUAL: CPT | Mod: 26

## 2023-04-16 RX ORDER — OXYCODONE HYDROCHLORIDE 5 MG/1
5 TABLET ORAL EVERY 6 HOURS
Refills: 0 | Status: DISCONTINUED | OUTPATIENT
Start: 2023-04-16 | End: 2023-04-17

## 2023-04-16 RX ADMIN — Medication 1 APPLICATION(S): at 21:41

## 2023-04-16 RX ADMIN — ENOXAPARIN SODIUM 40 MILLIGRAM(S): 100 INJECTION SUBCUTANEOUS at 15:23

## 2023-04-16 RX ADMIN — Medication 40 MILLIGRAM(S): at 05:55

## 2023-04-16 RX ADMIN — Medication 1 APPLICATION(S): at 15:29

## 2023-04-16 RX ADMIN — Medication 1 APPLICATION(S): at 15:56

## 2023-04-16 RX ADMIN — ISOSORBIDE MONONITRATE 60 MILLIGRAM(S): 60 TABLET, EXTENDED RELEASE ORAL at 12:00

## 2023-04-16 RX ADMIN — CEFTRIAXONE 100 MILLIGRAM(S): 500 INJECTION, POWDER, FOR SOLUTION INTRAMUSCULAR; INTRAVENOUS at 07:43

## 2023-04-16 RX ADMIN — Medication 3 MILLILITER(S): at 09:17

## 2023-04-16 RX ADMIN — TIOTROPIUM BROMIDE 2 PUFF(S): 18 CAPSULE ORAL; RESPIRATORY (INHALATION) at 15:25

## 2023-04-16 RX ADMIN — Medication 3 MILLILITER(S): at 20:32

## 2023-04-16 RX ADMIN — ATORVASTATIN CALCIUM 40 MILLIGRAM(S): 80 TABLET, FILM COATED ORAL at 21:42

## 2023-04-16 RX ADMIN — LIDOCAINE 1 PATCH: 4 CREAM TOPICAL at 20:00

## 2023-04-16 RX ADMIN — Medication 3 MILLILITER(S): at 15:54

## 2023-04-16 RX ADMIN — LIDOCAINE 1 PATCH: 4 CREAM TOPICAL at 12:00

## 2023-04-16 RX ADMIN — AZITHROMYCIN 255 MILLIGRAM(S): 500 TABLET, FILM COATED ORAL at 07:53

## 2023-04-16 RX ADMIN — Medication 1 APPLICATION(S): at 15:26

## 2023-04-16 RX ADMIN — Medication 40 MILLIGRAM(S): at 08:05

## 2023-04-16 RX ADMIN — PANTOPRAZOLE SODIUM 40 MILLIGRAM(S): 20 TABLET, DELAYED RELEASE ORAL at 07:45

## 2023-04-16 NOTE — PATIENT PROFILE ADULT - FALL HARM RISK - HARM RISK INTERVENTIONS
Assistance with ambulation/Assistance OOB with selected safe patient handling equipment/Communicate Risk of Fall with Harm to all staff/Discuss with provider need for PT consult/Monitor gait and stability/Provide patient with walking aids - walker, cane, crutches/Reinforce activity limits and safety measures with patient and family/Tailored Fall Risk Interventions/Visual Cue: Yellow wristband and red socks/Bed in lowest position, wheels locked, appropriate side rails in place/Call bell, personal items and telephone in reach/Instruct patient to call for assistance before getting out of bed or chair/Non-slip footwear when patient is out of bed/[place] to call system/Physically safe environment - no spills, clutter or unnecessary equipment/Purposeful Proactive Rounding/Room/bathroom lighting operational, light cord in reach Assistance with ambulation/Assistance OOB with selected safe patient handling equipment/Communicate Risk of Fall with Harm to all staff/Discuss with provider need for PT consult/Monitor gait and stability/Provide patient with walking aids - walker, cane, crutches/Reinforce activity limits and safety measures with patient and family/Tailored Fall Risk Interventions/Visual Cue: Yellow wristband and red socks/Bed in lowest position, wheels locked, appropriate side rails in place/Call bell, personal items and telephone in reach/Instruct patient to call for assistance before getting out of bed or chair/Non-slip footwear when patient is out of bed/[place] to call system/Physically safe environment - no spills, clutter or unnecessary equipment/Purposeful Proactive Rounding/Room/bathroom lighting operational, light cord in reach Assistance with ambulation/Assistance OOB with selected safe patient handling equipment/Communicate Risk of Fall with Harm to all staff/Discuss with provider need for PT consult/Monitor gait and stability/Provide patient with walking aids - walker, cane, crutches/Reinforce activity limits and safety measures with patient and family/Tailored Fall Risk Interventions/Visual Cue: Yellow wristband and red socks/Bed in lowest position, wheels locked, appropriate side rails in place/Call bell, personal items and telephone in reach/Instruct patient to call for assistance before getting out of bed or chair/Non-slip footwear when patient is out of bed/[place] to call system/Physically safe environment - no spills, clutter or unnecessary equipment/Purposeful Proactive Rounding/Room/bathroom lighting operational, light cord in reach

## 2023-04-16 NOTE — PROGRESS NOTE ADULT - SUBJECTIVE AND OBJECTIVE BOX
Patient is a 87y old  Male who presents with a chief complaint of COPD exacerbation; pneumonia (2023 08:53)      SUBJECTIVE / OVERNIGHT EVENTS: Patient seen and examined at bedside. No acute events overnight. He denies CP. He states breathing has improved. He is complaining of bilateral leg pain on the shins and feet. Of note there does not appear to be a superimposed infection. Legs are warm and DP/PT pulses are 2+    MEDICATIONS  (STANDING):  albuterol/ipratropium for Nebulization 3 milliLiter(s) Nebulizer every 6 hours  ammonium lactate 12% Lotion 1 Application(s) Topical two times a day  atorvastatin 40 milliGRAM(s) Oral at bedtime  azithromycin  IVPB 500 milliGRAM(s) IV Intermittent every 24 hours  cefTRIAXone   IVPB 1000 milliGRAM(s) IV Intermittent every 24 hours  enoxaparin Injectable 40 milliGRAM(s) SubCutaneous every 24 hours  furosemide    Tablet 40 milliGRAM(s) Oral daily  isosorbide   mononitrate ER Tablet (IMDUR) 60 milliGRAM(s) Oral daily  lidocaine   4% Patch 1 Patch Transdermal daily  pantoprazole    Tablet 40 milliGRAM(s) Oral before breakfast  predniSONE   Tablet 40 milliGRAM(s) Oral every 24 hours  silver sulfADIAZINE 1% Cream 1 Application(s) Topical daily  tiotropium 2.5 MICROgram(s) Inhaler 2 Puff(s) Inhalation daily  vitamin A &amp; D Ointment 1 Application(s) Topical two times a day    MEDICATIONS  (PRN):  acetaminophen     Tablet .. 650 milliGRAM(s) Oral every 6 hours PRN Temp greater or equal to 38C (100.4F), Mild Pain (1 - 3)  albuterol    90 MICROgram(s) HFA Inhaler 2 Puff(s) Inhalation every 6 hours PRN Shortness of Breath and/or Wheezing  guaiFENesin Oral Liquid (Sugar-Free) 100 milliGRAM(s) Oral every 6 hours PRN Cough  oxyCODONE    IR 5 milliGRAM(s) Oral every 6 hours PRN Severe Pain (7 - 10)      CAPILLARY BLOOD GLUCOSE        I&O's Summary      PHYSICAL EXAM:  Vital Signs Last 24 Hrs  T(C): 36.6 (2023 11:36), Max: 38.2 (15 Apr 2023 12:48)  T(F): 97.9 (2023 11:36), Max: 100.7 (15 Apr 2023 12:48)  HR: 99 (2023 11:36) (70 - 99)  BP: 131/74 (2023 11:36) (93/56 - 143/84)  BP(mean): --  RR: 19 (2023 11:36) (18 - 20)  SpO2: 96% (2023 11:36) (92% - 100%)    Parameters below as of 2023 11:36  Patient On (Oxygen Delivery Method): nasal cannula  O2 Flow (L/min): 2      GEN: male in NAD, appears comfortable, no diaphoresis  EYES: No scleral injection, PERRL, EOMI  ENTM: neck supple & symmetric without tracheal deviation, moist membranes, no gross hearing impairment, thyroid gland not enlarged  CV: +S1/S2, no m/r/g, no abdominal bruit, no LE edema  RESP: breathing comfortably, no respiratory accessory muscle use, +coarse crackles bilateral  GI: normoactive BS, soft, NTND, no rebounding/guarding, no palpable masses    LABS:                        11.2   7.62  )-----------( 275      ( 2023 05:12 )             34.6     04-16    137  |  97  |  31<H>  ----------------------------<  166<H>  4.4   |  33<H>  |  0.96    Ca    9.2      2023 05:12  Phos  3.9     04-16  Mg     2.3     04-16    TPro  7.2  /  Alb  2.4<L>  /  TBili  0.6  /  DBili  x   /  AST  17  /  ALT  56  /  AlkPhos  106  04-16      CARDIAC MARKERS ( 15 Apr 2023 21:18 )  x     / x     / 67 U/L / x     / 1.4 ng/mL      Urinalysis Basic - ( 15 Apr 2023 14:00 )    Color: Yellow / Appearance: Clear / S.015 / pH: x  Gluc: x / Ketone: Trace  / Bili: Negative / Urobili: Negative   Blood: x / Protein: 30 mg/dL / Nitrite: Negative   Leuk Esterase: Negative / RBC: 2-5 /HPF / WBC 0-2 /HPF   Sq Epi: x / Non Sq Epi: x / Bacteria: Trace /HPF          RADIOLOGY & ADDITIONAL TESTS:  Results Reviewed:   Imaging Personally Reviewed:  Electrocardiogram Personally Reviewed:    COORDINATION OF CARE:  Care Discussed with Consultants/Other Providers [Y/N]:  Prior or Outpatient Records Reviewed [Y/N]:

## 2023-04-16 NOTE — ED ADULT NURSE REASSESSMENT NOTE - NS ED NURSE REASSESS COMMENT FT1
Received patient alert and verbally responsive on N/C 3lit/min awaiting for bed availability, safety and comfort maintained.

## 2023-04-16 NOTE — PROGRESS NOTE ADULT - ASSESSMENT
Patient is a 88 y/o M from [facility], ambulates with a cane. He has PMHx of COPD on 2LPM O2 via nasal cannula, PPM, Chronic systolic HF, PPM, PVD, lymphedema. Admitted for COPD exacerbation 2/2 pneumonia Patient is a 86 y/o M from [facility], ambulates with a cane. He has PMHx of COPD on 2LPM O2 via nasal cannula, PPM, Chronic systolic HF, PPM, PVD, lymphedema. Admitted for COPD exacerbation 2/2 pneumonia Patient is a 86 y/o M from [facility], ambulates with a cane. He has PMHx of COPD on 2LPM O2 via nasal cannula, PPM, Chronic systolic HF, PPM, PVD, lymphedema. Admitted for COPD exacerbation 2/2 pneumonia

## 2023-04-16 NOTE — PROCEDURE NOTE - ADDITIONAL PROCEDURE DETAILS
RA lead: [id number]  RV lead: [id number]    Presenting rhythm: AS,  @ 100 bpm.  Battery with 8.9 years left.  AP 84.6%,  98.6%.  AT/AF 0.6%  10 AT/AF episodes, longest sustained episode 3 hrs 17 mins. Atrial rate in 170-190's, likely an atrial tachycardia, can also be an atypical flutter. Ventricular rate controlled in 70-80's.  No afib noted.  2 NSVT logged showing brief NSVT.    Normal sensing, impedances, thresholds.  NOT pacemaker dependent, A-V conduction intact but has long 1st degree heart block at 360ms.  Observed in AAI-DDD mode and remained in NSR with intact intrinsic A-V conduction. However did not make changes.  Changed back to DDDR mode and now Asensed, Vpaced.    Patient also notes when he has pain it is everywhere in chest except at pacemaker pocket area.

## 2023-04-16 NOTE — PROCEDURE NOTE - NSINTBATTERYSTAT_CARD_ALL_CORE
Huddled with GAVI Vanessa to work in.  Mom will leave fight away.    Austyn Horowitz, RN, BSN         Good

## 2023-04-16 NOTE — CONSULT NOTE ADULT - SUBJECTIVE AND OBJECTIVE BOX
History of Present Illness:  Reason for Admission: COPD exacerbation; pneumonia  History of Present Illness:   Patient is a 88 y/o M from [facility], ambulates with a cane. He has PMHx of COPD on 2LPM O2 via nasal cannula, PPM, Chronic systolic HF, PPM, PVD, lymphedema. He presented with worsening shortness of breath with associated non-productive cough and whitish sputum. He also reported intermittent left sided chest pain radiating to his left axilla and palpitations. He denies any fever, headache, dizziness, abdominal pain, diarrhea, constipation. In the ED, VS was /66, HR 88, RR 20, T 100.7 (38.2), O2 92% on 2LPM via nasal cannula. Labs were unremarkable. Troponin was negatvie. EKG showed atrial-sensed ventricular paced-rhythm. As per patient, PPM was last checked 3-4 months ago. He was given ASA, Zosyn and Aztreonam. Chest pain has resolved at the time of examination.    GOC: DNR/ TRIAL OF INTUBATION       Review of Systems:  Review of Systems: - CONSTITUTIONAL: Denies fever and chills  - HEENT: Denies changes in vision and hearing.  - RESPIRATORY: (+) SOB and cough.  - CV: (+) chest pain and palpitations  - GI: Denies abdominal pain, nausea, vomiting and diarrhea.  - : Denies dysuria and urinary frequency.  - SKIN: Denies rash and pruritus.  - NEUROLOGICAL: Denies headache and syncope.  - EXTREMITIES: (+) leg pain  - PSYCHIATRIC: Denies recent changes in mood. Denies anxiety and depression.  Other Review of Systems: All other review of systems negative, except as noted in HPI      Allergies and Intolerances:        Allergies:  	No Known Allergies:     Home Medications:   * Patient Currently Takes Medications as of 15-Apr-2023 18:24 documented in Structured Notes  · 	Protonix 40 mg oral delayed release tablet: Last Dose Taken:  , 1 tab(s) orally once a day  · 	atorvastatin 40 mg oral tablet: Last Dose Taken:  , 1 tab(s) orally once a day (at bedtime)  · 	ipratropium-albuterol 0.5 mg-2.5 mg/3 mL inhalation solution: Last Dose Taken:  , 3 milliliter(s) by nebulizer every 6 hours as needed for  shortness of breath and/or wheezing  · 	ammonium lactate 12% topical lotion: Last Dose Taken:  , Apply topically to affected area 2 times a day lower extremities  · 	A+D topical ointment: Last Dose Taken:  , Apply topically to affected area 2 times a day lower extremities  · 	Silvadene 1% topical cream: Last Dose Taken:  , Apply topically to affected area once a day sacrum  · 	Ambien 5 mg oral tablet: Last Dose Taken:  , 1 tab(s) orally once a day (at bedtime)  · 	isosorbide mononitrate 60 mg oral tablet, extended release: Last Dose Taken:  , 1 tab(s) orally once a day  · 	Lasix 40 mg oral tablet: Last Dose Taken:  , 1 tab(s) orally once a day    .    Patient History:    Past Medical, Past Surgical, and Family History:  PAST MEDICAL HISTORY:  Cardiac pacemaker     H/O polyarthritis     History of COPD     Lymphedema.     PAST SURGICAL HISTORY:  H/O inguinal hernia repair.     FAMILY HISTORY:  No pertinent family history in first degree relatives. No pertinent family history of: asthma and atrial fibrillation.     Social History:  · Substance use	No  · Social History (marital status, living situation, occupation, and sexual history)	came from [facility]  walks with a cane  former smoker (quit several years ago)  non-alcoholic beverage drinker  no illicit drug use      MEDICATIONS  (STANDING):  albuterol/ipratropium for Nebulization 3 milliLiter(s) Nebulizer every 6 hours  ammonium lactate 12% Lotion 1 Application(s) Topical two times a day  atorvastatin 40 milliGRAM(s) Oral at bedtime  azithromycin  IVPB 500 milliGRAM(s) IV Intermittent every 24 hours  cefTRIAXone   IVPB 1000 milliGRAM(s) IV Intermittent every 24 hours  enoxaparin Injectable 40 milliGRAM(s) SubCutaneous every 24 hours  furosemide    Tablet 40 milliGRAM(s) Oral daily  isosorbide   mononitrate ER Tablet (IMDUR) 60 milliGRAM(s) Oral daily  lidocaine   4% Patch 1 Patch Transdermal daily  pantoprazole    Tablet 40 milliGRAM(s) Oral before breakfast  predniSONE   Tablet 40 milliGRAM(s) Oral every 24 hours  silver sulfADIAZINE 1% Cream 1 Application(s) Topical daily  tiotropium 2.5 MICROgram(s) Inhaler 2 Puff(s) Inhalation daily  vitamin A &amp; D Ointment 1 Application(s) Topical two times a day    MEDICATIONS  (PRN):  acetaminophen     Tablet .. 650 milliGRAM(s) Oral every 6 hours PRN Temp greater or equal to 38C (100.4F), Mild Pain (1 - 3)  albuterol    90 MICROgram(s) HFA Inhaler 2 Puff(s) Inhalation every 6 hours PRN Shortness of Breath and/or Wheezing  guaiFENesin Oral Liquid (Sugar-Free) 100 milliGRAM(s) Oral every 6 hours PRN Cough      Physical Exam:   Vital Signs Last 24 Hrs  T(C): 36.4 (16 Apr 2023 07:40), Max: 38.2 (15 Apr 2023 12:48)  T(F): 97.6 (16 Apr 2023 07:40), Max: 100.7 (15 Apr 2023 12:48)  HR: 78 (16 Apr 2023 07:40) (70 - 88)  BP: 143/84 (16 Apr 2023 07:40) (93/56 - 143/84)  BP(mean): --  RR: 18 (16 Apr 2023 07:40) (18 - 20)  SpO2: 96% (16 Apr 2023 07:40) (92% - 100%)    Parameters below as of 16 Apr 2023 07:40  Patient On (Oxygen Delivery Method): nasal cannula        PHYSICAL EXAM:  GENERAL: NAD, speaks in full sentences, no signs of respiratory distress  HEAD:  Atraumatic, Normocephalic  EYES: EOMI, PERRLA, conjunctiva and sclera clear  NECK: Supple, No JVD  CHEST/LUNG:(+) rhonchi, (+) decreased breath sounds on both lung bases; No accessory muscles used  HEART: Regular rate and rhythm; No murmurs;   ABDOMEN: Soft, Nontender, Nondistended; Bowel sounds present; No guarding  EXTREMITIES:  2+ Peripheral Pulses, No cyanosis or edema  PSYCH: AAOx3  NEUROLOGY: non-focal  SKIN: (+) chronic venous stasis; (+) necrotic lesions over R side of nose; L upper cheek      Laboratory:                         11.2   7.62  )-----------( 275      ( 16 Apr 2023 05:12 )             34.6   04-16    137  |  97  |  31<H>  ----------------------------<  166<H>  4.4   |  33<H>  |  0.96    Ca    9.2      16 Apr 2023 05:12  Phos  3.9     04-16  Mg     2.3     04-16    TPro  7.2  /  Alb  2.4<L>  /  TBili  0.6  /  DBili  x   /  AST  17  /  ALT  56  /  AlkPhos  106  04-16    Radiology  < from: Xray Chest 1 View- PORTABLE-Urgent (04.15.23 @ 13:57) >  ACC: [id number] EXAM:  XR CHEST PORTABLE URGENT 1V   ORDERED BY: LATISHA JEROME     PROCEDURE DATE:  04/15/2023          INTERPRETATION:  AP chest on April 15, 2023 at 1:35 PM. Patient has chest   pain.    COMPARISON: None available.    Heart possibly enlarged.    There is a dual-chamber left-sided pacemaker.    There is a rather small left base infiltrate.    IMPRESSION: Heart enlargement and pacemaker. Small left base infiltrate.    --- End of Report ---          < end of copied text >    History of Present Illness:  Reason for Admission: COPD exacerbation; pneumonia  History of Present Illness:   [facility] 86 [facility]. He has PMHx of COPD on 2LPM O2 via nasal cannula, PPM, Chronic systolic HF, PPM, PVD, lymphedema. He presented with worsening shortness of breath with associated non-productive cough and whitish sputum. He also reported intermittent left sided chest pain radiating to his left axilla and palpitations. He denies any fever, headache, dizziness, abdominal pain, diarrhea, constipation. In the ED, VS was /66, HR 88, RR 20, T 100.7 (38.2), O2 92% on 2LPM via nasal cannula. Labs were unremarkable. Troponin was negatvie. EKG showed atrial-sensed ventricular paced-rhythm. As per patient, PPM was last checked 3-4 months ago. He was given ASA, Zosyn and Aztreonam. Chest pain has resolved at the time of examination.    GOC: DNR/ TRIAL OF INTUBATION       Review of Systems:  Review of Systems: - CONSTITUTIONAL: Denies fever and chills  - HEENT: Denies changes in vision and hearing.  - RESPIRATORY: (+) SOB and cough.  - CV: (+) chest pain and palpitations  - GI: Denies abdominal pain, nausea, vomiting and diarrhea.  - : Denies dysuria and urinary frequency.  - SKIN: Denies rash and pruritus.  - NEUROLOGICAL: Denies headache and syncope.  - EXTREMITIES: (+) leg pain  - PSYCHIATRIC: Denies recent changes in mood. Denies anxiety and depression.  Other Review of Systems: All other review of systems negative, except as noted in HPI      Allergies and Intolerances:        Allergies:  	No Known Allergies:     Home Medications:   * Patient Currently Takes Medications as of 15-Apr-2023 18:24 documented in Structured Notes  · 	Protonix 40 mg oral delayed release tablet: Last Dose Taken:  , 1 tab(s) orally once a day  · 	atorvastatin 40 mg oral tablet: Last Dose Taken:  , 1 tab(s) orally once a day (at bedtime)  · 	ipratropium-albuterol 0.5 mg-2.5 mg/3 mL inhalation solution: Last Dose Taken:  , 3 milliliter(s) by nebulizer every 6 hours as needed for  shortness of breath and/or wheezing  · 	ammonium lactate 12% topical lotion: Last Dose Taken:  , Apply topically to affected area 2 times a day lower extremities  · 	A+D topical ointment: Last Dose Taken:  , Apply topically to affected area 2 times a day lower extremities  · 	Silvadene 1% topical cream: Last Dose Taken:  , Apply topically to affected area once a day sacrum  · 	Ambien 5 mg oral tablet: Last Dose Taken:  , 1 tab(s) orally once a day (at bedtime)  · 	isosorbide mononitrate 60 mg oral tablet, extended release: Last Dose Taken:  , 1 tab(s) orally once a day  · 	Lasix 40 mg oral tablet: Last Dose Taken:  , 1 tab(s) orally once a day    .    Patient History:    Past Medical, Past Surgical, and Family History:  PAST MEDICAL HISTORY:  Cardiac pacemaker     H/O polyarthritis     History of COPD     Lymphedema.     PAST SURGICAL HISTORY:  H/O inguinal hernia repair.     FAMILY HISTORY:  No pertinent family history in first degree relatives. No pertinent family history of: asthma and atrial fibrillation.     Social History:  · Substance use	No  · Social History (marital status, living situation, occupation, and sexual history)	[facility]  former smoker (quit several years ago)  non-alcoholic beverage drinker  no illicit drug use      MEDICATIONS  (STANDING):  albuterol/ipratropium for Nebulization 3 milliLiter(s) Nebulizer every 6 hours  ammonium lactate 12% Lotion 1 Application(s) Topical two times a day  atorvastatin 40 milliGRAM(s) Oral at bedtime  azithromycin  IVPB 500 milliGRAM(s) IV Intermittent every 24 hours  cefTRIAXone   IVPB 1000 milliGRAM(s) IV Intermittent every 24 hours  enoxaparin Injectable 40 milliGRAM(s) SubCutaneous every 24 hours  furosemide    Tablet 40 milliGRAM(s) Oral daily  isosorbide   mononitrate ER Tablet (IMDUR) 60 milliGRAM(s) Oral daily  lidocaine   4% Patch 1 Patch Transdermal daily  pantoprazole    Tablet 40 milliGRAM(s) Oral before breakfast  predniSONE   Tablet 40 milliGRAM(s) Oral every 24 hours  silver sulfADIAZINE 1% Cream 1 Application(s) Topical daily  tiotropium 2.5 MICROgram(s) Inhaler 2 Puff(s) Inhalation daily  vitamin A &amp; D Ointment 1 Application(s) Topical two times a day    MEDICATIONS  (PRN):  acetaminophen     Tablet .. 650 milliGRAM(s) Oral every 6 hours PRN Temp greater or equal to 38C (100.4F), Mild Pain (1 - 3)  albuterol    90 MICROgram(s) HFA Inhaler 2 Puff(s) Inhalation every 6 hours PRN Shortness of Breath and/or Wheezing  guaiFENesin Oral Liquid (Sugar-Free) 100 milliGRAM(s) Oral every 6 hours PRN Cough      Physical Exam:   Vital Signs Last 24 Hrs  T(C): 36.4 (16 Apr 2023 07:40), Max: 38.2 (15 Apr 2023 12:48)  T(F): 97.6 (16 Apr 2023 07:40), Max: 100.7 (15 Apr 2023 12:48)  HR: 78 (16 Apr 2023 07:40) (70 - 88)  BP: 143/84 (16 Apr 2023 07:40) (93/56 - 143/84)  BP(mean): --  RR: 18 (16 Apr 2023 07:40) (18 - 20)  SpO2: 96% (16 Apr 2023 07:40) (92% - 100%)    Parameters below as of 16 Apr 2023 07:40  Patient On (Oxygen Delivery Method): nasal cannula        PHYSICAL EXAM:  GENERAL: NAD, speaks in full sentences, no signs of respiratory distress  HEAD:  Atraumatic, Normocephalic  EYES: EOMI, PERRLA, conjunctiva and sclera clear  NECK: Supple, No JVD  CHEST/LUNG:(+) rhonchi, (+) decreased breath sounds on both lung bases; No accessory muscles used  HEART: Regular rate and rhythm; No murmurs;   ABDOMEN: Soft, Nontender, Nondistended; Bowel sounds present; No guarding  EXTREMITIES:  2+ Peripheral Pulses, No cyanosis or edema  PSYCH: AAOx3  NEUROLOGY: non-focal  SKIN: (+) chronic venous stasis; (+) necrotic lesions over R side of nose; L upper cheek      Laboratory:                         11.2   7.62  )-----------( 275      ( 16 Apr 2023 05:12 )             34.6   04-16    137  |  97  |  31<H>  ----------------------------<  166<H>  4.4   |  33<H>  |  0.96    Ca    9.2      16 Apr 2023 05:12  Phos  3.9     04-16  Mg     2.3     04-16    TPro  7.2  /  Alb  2.4<L>  /  TBili  0.6  /  DBili  x   /  AST  17  /  ALT  56  /  AlkPhos  106  04-16    Radiology  < from: Xray Chest 1 View- PORTABLE-Urgent (04.15.23 @ 13:57) >  ACC: [id number] EXAM:  XR CHEST PORTABLE URGENT 1V   ORDERED BY: LATISHA JEROME     PROCEDURE DATE:  04/15/2023          INTERPRETATION:  AP chest on April 15, 2023 at 1:35 PM. Patient has chest   pain.    COMPARISON: None available.    Heart possibly enlarged.    There is a dual-chamber left-sided pacemaker.    There is a rather small left base infiltrate.    IMPRESSION: Heart enlargement and pacemaker. Small left base infiltrate.    --- End of Report ---          < end of copied text >    History of Present Illness:  Reason for Admission: COPD exacerbation; pneumonia  History of Present Illness:   Patient is a 88 y/o M from [facility], ambulates with a cane. He has PMHx of COPD on 2LPM O2 via nasal cannula, PPM, Chronic systolic HF, PPM, PVD, lymphedema. He presented with worsening shortness of breath with associated non-productive cough and whitish sputum. He also reported intermittent left sided chest pain radiating to his left axilla and palpitations. He denies any fever, headache, dizziness, abdominal pain, diarrhea, constipation. In the ED, VS was /66, HR 88, RR 20, T 100.7 (38.2), O2 92% on 2LPM via nasal cannula. Labs were unremarkable. Troponin was negatvie. EKG showed atrial-sensed ventricular paced-rhythm. As per patient, PPM was last checked 3-4 months ago. He was given ASA, Zosyn and Aztreonam. Chest pain has resolved at the time of examination.    GOC: DNR/ TRIAL OF INTUBATION       Review of Systems:  Review of Systems: - CONSTITUTIONAL: Denies fever and chills  - HEENT: Denies changes in vision and hearing.  - RESPIRATORY: (+) SOB and cough.  - CV: (+) chest pain and palpitations  - GI: Denies abdominal pain, nausea, vomiting and diarrhea.  - : Denies dysuria and urinary frequency.  - SKIN: Denies rash and pruritus.  - NEUROLOGICAL: Denies headache and syncope.  - EXTREMITIES: (+) leg pain  - PSYCHIATRIC: Denies recent changes in mood. Denies anxiety and depression.  Other Review of Systems: All other review of systems negative, except as noted in HPI      Allergies and Intolerances:        Allergies:  	No Known Allergies:     Home Medications:   * Patient Currently Takes Medications as of 15-Apr-2023 18:24 documented in Structured Notes  · 	Protonix 40 mg oral delayed release tablet: Last Dose Taken:  , 1 tab(s) orally once a day  · 	atorvastatin 40 mg oral tablet: Last Dose Taken:  , 1 tab(s) orally once a day (at bedtime)  · 	ipratropium-albuterol 0.5 mg-2.5 mg/3 mL inhalation solution: Last Dose Taken:  , 3 milliliter(s) by nebulizer every 6 hours as needed for  shortness of breath and/or wheezing  · 	ammonium lactate 12% topical lotion: Last Dose Taken:  , Apply topically to affected area 2 times a day lower extremities  · 	A+D topical ointment: Last Dose Taken:  , Apply topically to affected area 2 times a day lower extremities  · 	Silvadene 1% topical cream: Last Dose Taken:  , Apply topically to affected area once a day sacrum  · 	Ambien 5 mg oral tablet: Last Dose Taken:  , 1 tab(s) orally once a day (at bedtime)  · 	isosorbide mononitrate 60 mg oral tablet, extended release: Last Dose Taken:  , 1 tab(s) orally once a day  · 	Lasix 40 mg oral tablet: Last Dose Taken:  , 1 tab(s) orally once a day    .    Patient History:    Past Medical, Past Surgical, and Family History:  PAST MEDICAL HISTORY:  Cardiac pacemaker     H/O polyarthritis     History of COPD     Lymphedema.     PAST SURGICAL HISTORY:  H/O inguinal hernia repair.     FAMILY HISTORY:  No pertinent family history in first degree relatives. No pertinent family history of: asthma and atrial fibrillation.     Social History:  · Substance use	No  · Social History (marital status, living situation, occupation, and sexual history)	came from [facility]  walks with a cane  former smoker (quit several years ago)  non-alcoholic beverage drinker  no illicit drug use      MEDICATIONS  (STANDING):  albuterol/ipratropium for Nebulization 3 milliLiter(s) Nebulizer every 6 hours  ammonium lactate 12% Lotion 1 Application(s) Topical two times a day  atorvastatin 40 milliGRAM(s) Oral at bedtime  azithromycin  IVPB 500 milliGRAM(s) IV Intermittent every 24 hours  cefTRIAXone   IVPB 1000 milliGRAM(s) IV Intermittent every 24 hours  enoxaparin Injectable 40 milliGRAM(s) SubCutaneous every 24 hours  furosemide    Tablet 40 milliGRAM(s) Oral daily  isosorbide   mononitrate ER Tablet (IMDUR) 60 milliGRAM(s) Oral daily  lidocaine   4% Patch 1 Patch Transdermal daily  pantoprazole    Tablet 40 milliGRAM(s) Oral before breakfast  predniSONE   Tablet 40 milliGRAM(s) Oral every 24 hours  silver sulfADIAZINE 1% Cream 1 Application(s) Topical daily  tiotropium 2.5 MICROgram(s) Inhaler 2 Puff(s) Inhalation daily  vitamin A &amp; D Ointment 1 Application(s) Topical two times a day    MEDICATIONS  (PRN):  acetaminophen     Tablet .. 650 milliGRAM(s) Oral every 6 hours PRN Temp greater or equal to 38C (100.4F), Mild Pain (1 - 3)  albuterol    90 MICROgram(s) HFA Inhaler 2 Puff(s) Inhalation every 6 hours PRN Shortness of Breath and/or Wheezing  guaiFENesin Oral Liquid (Sugar-Free) 100 milliGRAM(s) Oral every 6 hours PRN Cough      Physical Exam:   Vital Signs Last 24 Hrs  T(C): 36.4 (16 Apr 2023 07:40), Max: 38.2 (15 Apr 2023 12:48)  T(F): 97.6 (16 Apr 2023 07:40), Max: 100.7 (15 Apr 2023 12:48)  HR: 78 (16 Apr 2023 07:40) (70 - 88)  BP: 143/84 (16 Apr 2023 07:40) (93/56 - 143/84)  BP(mean): --  RR: 18 (16 Apr 2023 07:40) (18 - 20)  SpO2: 96% (16 Apr 2023 07:40) (92% - 100%)    Parameters below as of 16 Apr 2023 07:40  Patient On (Oxygen Delivery Method): nasal cannula        PHYSICAL EXAM:  GENERAL: NAD, speaks in full sentences, no signs of respiratory distress  HEAD:  Atraumatic, Normocephalic  EYES: EOMI, PERRLA, conjunctiva and sclera clear  NECK: Supple, No JVD  CHEST/LUNG:(+) rhonchi, (+) decreased breath sounds on both lung bases; No accessory muscles used  HEART: Regular rate and rhythm; No murmurs;   ABDOMEN: Soft, Nontender, Nondistended; Bowel sounds present; No guarding  EXTREMITIES:  2+ Peripheral Pulses, No cyanosis or edema  PSYCH: AAOx3  NEUROLOGY: non-focal  SKIN: (+) chronic venous stasis; (+) necrotic lesions over R side of nose; L upper cheek      Laboratory:                         11.2   7.62  )-----------( 275      ( 16 Apr 2023 05:12 )             34.6   04-16    137  |  97  |  31<H>  ----------------------------<  166<H>  4.4   |  33<H>  |  0.96    Ca    9.2      16 Apr 2023 05:12  Phos  3.9     04-16  Mg     2.3     04-16    TPro  7.2  /  Alb  2.4<L>  /  TBili  0.6  /  DBili  x   /  AST  17  /  ALT  56  /  AlkPhos  106  04-16    Radiology  < from: Xray Chest 1 View- PORTABLE-Urgent (04.15.23 @ 13:57) >  ACC: [id number] EXAM:  XR CHEST PORTABLE URGENT 1V   ORDERED BY: LATISHA JEROME     PROCEDURE DATE:  04/15/2023          INTERPRETATION:  AP chest on April 15, 2023 at 1:35 PM. Patient has chest   pain.    COMPARISON: None available.    Heart possibly enlarged.    There is a dual-chamber left-sided pacemaker.    There is a rather small left base infiltrate.    IMPRESSION: Heart enlargement and pacemaker. Small left base infiltrate.    --- End of Report ---          < end of copied text >

## 2023-04-16 NOTE — PROGRESS NOTE ADULT - PROBLEM SELECTOR PLAN 1
- Possible COPD based on history & per ED had gross wheezing on arrival  - Will start standing DuoNebs and Prednisone  - Pulm consult  - Continue with LAMA  - Wean O2 to baseline 2 L  - Already on Zithromax for PNA

## 2023-04-16 NOTE — PATIENT PROFILE ADULT - NSPROEXTENSIONSOFSELF_GEN_A_NUR
left dentures at [place]/[facility] left dentures at [place]/[facility] left dentures at [place]/[facility]

## 2023-04-17 VITALS
TEMPERATURE: 98 F | RESPIRATION RATE: 18 BRPM | DIASTOLIC BLOOD PRESSURE: 70 MMHG | HEART RATE: 77 BPM | OXYGEN SATURATION: 95 % | SYSTOLIC BLOOD PRESSURE: 118 MMHG

## 2023-04-17 LAB
ALBUMIN SERPL ELPH-MCNC: 2.8 G/DL — LOW (ref 3.5–5)
ALP SERPL-CCNC: 108 U/L — SIGNIFICANT CHANGE UP (ref 40–120)
ALT FLD-CCNC: 53 U/L DA — SIGNIFICANT CHANGE UP (ref 10–60)
ANION GAP SERPL CALC-SCNC: 7 MMOL/L — SIGNIFICANT CHANGE UP (ref 5–17)
AST SERPL-CCNC: 14 U/L — SIGNIFICANT CHANGE UP (ref 10–40)
BASOPHILS # BLD AUTO: 0.01 K/UL — SIGNIFICANT CHANGE UP (ref 0–0.2)
BASOPHILS NFR BLD AUTO: 0.1 % — SIGNIFICANT CHANGE UP (ref 0–2)
BILIRUB SERPL-MCNC: 0.5 MG/DL — SIGNIFICANT CHANGE UP (ref 0.2–1.2)
BUN SERPL-MCNC: 30 MG/DL — HIGH (ref 7–18)
CALCIUM SERPL-MCNC: 9.6 MG/DL — SIGNIFICANT CHANGE UP (ref 8.4–10.5)
CHLORIDE SERPL-SCNC: 98 MMOL/L — SIGNIFICANT CHANGE UP (ref 96–108)
CO2 SERPL-SCNC: 34 MMOL/L — HIGH (ref 22–31)
CREAT SERPL-MCNC: 0.85 MG/DL — SIGNIFICANT CHANGE UP (ref 0.5–1.3)
EGFR: 84 ML/MIN/1.73M2 — SIGNIFICANT CHANGE UP
EOSINOPHIL # BLD AUTO: 0.03 K/UL — SIGNIFICANT CHANGE UP (ref 0–0.5)
EOSINOPHIL NFR BLD AUTO: 0.2 % — SIGNIFICANT CHANGE UP (ref 0–6)
GLUCOSE SERPL-MCNC: 106 MG/DL — HIGH (ref 70–99)
HCT VFR BLD CALC: 37.8 % — LOW (ref 39–50)
HGB BLD-MCNC: 12.5 G/DL — LOW (ref 13–17)
IMM GRANULOCYTES NFR BLD AUTO: 0.6 % — SIGNIFICANT CHANGE UP (ref 0–0.9)
LYMPHOCYTES # BLD AUTO: 0.51 K/UL — LOW (ref 1–3.3)
LYMPHOCYTES # BLD AUTO: 4 % — LOW (ref 13–44)
MAGNESIUM SERPL-MCNC: 2.4 MG/DL — SIGNIFICANT CHANGE UP (ref 1.6–2.6)
MCHC RBC-ENTMCNC: 30.6 PG — SIGNIFICANT CHANGE UP (ref 27–34)
MCHC RBC-ENTMCNC: 33.1 GM/DL — SIGNIFICANT CHANGE UP (ref 32–36)
MCV RBC AUTO: 92.4 FL — SIGNIFICANT CHANGE UP (ref 80–100)
MONOCYTES # BLD AUTO: 0.42 K/UL — SIGNIFICANT CHANGE UP (ref 0–0.9)
MONOCYTES NFR BLD AUTO: 3.3 % — SIGNIFICANT CHANGE UP (ref 2–14)
NEUTROPHILS # BLD AUTO: 11.63 K/UL — HIGH (ref 1.8–7.4)
NEUTROPHILS NFR BLD AUTO: 91.8 % — HIGH (ref 43–77)
NRBC # BLD: 0 /100 WBCS — SIGNIFICANT CHANGE UP (ref 0–0)
PHOSPHATE SERPL-MCNC: 3 MG/DL — SIGNIFICANT CHANGE UP (ref 2.5–4.5)
PLATELET # BLD AUTO: 339 K/UL — SIGNIFICANT CHANGE UP (ref 150–400)
POTASSIUM SERPL-MCNC: 3.8 MMOL/L — SIGNIFICANT CHANGE UP (ref 3.5–5.3)
POTASSIUM SERPL-SCNC: 3.8 MMOL/L — SIGNIFICANT CHANGE UP (ref 3.5–5.3)
PROT SERPL-MCNC: 8 G/DL — SIGNIFICANT CHANGE UP (ref 6–8.3)
RBC # BLD: 4.09 M/UL — LOW (ref 4.2–5.8)
RBC # FLD: 13.8 % — SIGNIFICANT CHANGE UP (ref 10.3–14.5)
SODIUM SERPL-SCNC: 139 MMOL/L — SIGNIFICANT CHANGE UP (ref 135–145)
WBC # BLD: 12.67 K/UL — HIGH (ref 3.8–10.5)
WBC # FLD AUTO: 12.67 K/UL — HIGH (ref 3.8–10.5)

## 2023-04-17 PROCEDURE — 99239 HOSP IP/OBS DSCHRG MGMT >30: CPT

## 2023-04-17 PROCEDURE — 99223 1ST HOSP IP/OBS HIGH 75: CPT

## 2023-04-17 RX ORDER — CEFPODOXIME PROXETIL 100 MG
1 TABLET ORAL
Qty: 4 | Refills: 0
Start: 2023-04-17 | End: 2023-04-18

## 2023-04-17 RX ORDER — BUDESONIDE AND FORMOTEROL FUMARATE DIHYDRATE 160; 4.5 UG/1; UG/1
2 AEROSOL RESPIRATORY (INHALATION)
Refills: 0 | Status: DISCONTINUED | OUTPATIENT
Start: 2023-04-17 | End: 2023-04-17

## 2023-04-17 RX ADMIN — Medication 650 MILLIGRAM(S): at 11:33

## 2023-04-17 RX ADMIN — LIDOCAINE 1 PATCH: 4 CREAM TOPICAL at 11:39

## 2023-04-17 RX ADMIN — Medication 1 APPLICATION(S): at 06:07

## 2023-04-17 RX ADMIN — Medication 3 MILLILITER(S): at 03:11

## 2023-04-17 RX ADMIN — Medication 1 APPLICATION(S): at 06:09

## 2023-04-17 RX ADMIN — PANTOPRAZOLE SODIUM 40 MILLIGRAM(S): 20 TABLET, DELAYED RELEASE ORAL at 06:08

## 2023-04-17 RX ADMIN — LIDOCAINE 1 PATCH: 4 CREAM TOPICAL at 00:05

## 2023-04-17 RX ADMIN — CEFTRIAXONE 100 MILLIGRAM(S): 500 INJECTION, POWDER, FOR SOLUTION INTRAMUSCULAR; INTRAVENOUS at 10:14

## 2023-04-17 RX ADMIN — Medication 3 MILLILITER(S): at 08:05

## 2023-04-17 RX ADMIN — ISOSORBIDE MONONITRATE 60 MILLIGRAM(S): 60 TABLET, EXTENDED RELEASE ORAL at 11:37

## 2023-04-17 RX ADMIN — Medication 650 MILLIGRAM(S): at 14:18

## 2023-04-17 RX ADMIN — Medication 40 MILLIGRAM(S): at 06:08

## 2023-04-17 RX ADMIN — Medication 3 MILLILITER(S): at 14:32

## 2023-04-17 RX ADMIN — TIOTROPIUM BROMIDE 2 PUFF(S): 18 CAPSULE ORAL; RESPIRATORY (INHALATION) at 11:43

## 2023-04-17 RX ADMIN — Medication 1 APPLICATION(S): at 11:41

## 2023-04-17 RX ADMIN — Medication 40 MILLIGRAM(S): at 08:55

## 2023-04-17 NOTE — DISCHARGE NOTE NURSING/CASE MANAGEMENT/SOCIAL WORK - PATIENT PORTAL LINK FT
You can access the FollowMyHealth Patient Portal offered by [facility] by registering at the following website: [email or web address]. By joining [facility]’s FollowMyHealth portal, you will also be able to view your health information using other applications (apps) compatible with our system. You can access the FollowMyHealth Patient Portal offered by [facility] by registering at the following website: [email or web address]. By joining [facility]s FollowMyHealth portal, you will also be able to view your health information using other applications (apps) compatible with our system. You can access the FollowMyHealth Patient Portal offered by [facility] by registering at the following website: [email or web address]. By joining [facility]’s FollowMyHealth portal, you will also be able to view your health information using other applications (apps) compatible with our system.

## 2023-04-17 NOTE — DISCHARGE NOTE NURSING/CASE MANAGEMENT/SOCIAL WORK - NSPROEXTENSIONSOFSELF_GEN_A_NUR
left dentures at [place]/[facility] left dentures at [place]/[facility] left dentures at [place]/[facility]

## 2023-04-17 NOTE — DISCHARGE NOTE PROVIDER - NSDCCAREPROVSEEN_GEN_ALL_CORE_FT
Molly, Scott Taveras, Shanon Davenport, Patel Gaxiola, Radha Jaramillo, Víctor Moreira, Crystal Sanchez, Anusha Gutierrez

## 2023-04-17 NOTE — DISCHARGE NOTE NURSING/CASE MANAGEMENT/SOCIAL WORK - NSDCPEFALRISK_GEN_ALL_CORE
For information on Fall & Injury Prevention, visit: [email or web address] OR  [email or web address] OR  https://www.cdc.gov/steadi/patient.html For information on Fall & Injury Prevention, visit: [email or web address]/[email or web address]-[email or web address]-mobility OR  [email or web address].[email or web address]/news/fall-prevention-tips-[email or web address]-injury OR  https://www.cdc.gov/steadi/patient.html For information on Fall & Injury Prevention, visit: [email or web address]-[email or web address]-[email or web address]-mobility OR  [email or web address].[email or web address]-[email or web address]-[email or web address]-injury OR  https://www.cdc.gov/steadi/patient.html

## 2023-04-17 NOTE — CONSULT NOTE ADULT - SUBJECTIVE AND OBJECTIVE BOX
PULMONARY SERVICE INITIAL CONSULT NOTE    HPI:  Patient is a 88 y/o M from [facility], ambulates with a cane. He has PMHx of COPD on 2LPM O2 via nasal cannula, PPM, Chronic systolic HF, PPM, PVD, lymphedema. He presented with worsening shortness of breath with associated non-productive cough and whitish sputum. He also reported intermittent left sided chest pain radiating to his left axilla and palpitations. He denies any fever, headache, dizziness, abdominal pain, diarrhea, constipation. In the ED, VS was /66, HR 88, RR 20, T 100.7 (38.2), O2 92% on 2LPM via nasal cannula. Labs were unremarkable. Troponin was negatvie. EKG showed atrial-sensed ventricular paced-rhythm. As per patient, PPM was last checked 3-4 months ago. He was given ASA, Zosyn and Aztreonam. Chest pain has resolved at the time of examination.    GOC: DNR/ TRIAL OF INTUBATION (15 Apr 2023 19:10)      REVIEW OF SYSTEMS:  Constitutional: No fever, weight loss or fatigue  Eyes: No eye pain, visual disturbances, or discharge  ENMT:  No difficulty hearing, tinnitus, vertigo; No sinus or throat pain  Neck: No pain, stiffness or neck swelling  Respiratory: see HPI  Cardiovascular: No chest pain, palpitations, dizziness or leg swelling  Gastrointestinal: No abdominal or epigastric pain. No nausea, vomiting or hematemesis; No diarrhea or constipation. No melena or hematochezia.  Genitourinary: No dysuria, frequency, hematuria or incontinence  Neurological: No headaches, memory loss, loss of strength, numbness or tremors  Skin: No itching, burning, rashes or lesions   Lymph Nodes: No enlarged glands  Endocrine: No heat or cold intolerance; No hair loss  Musculoskeletal: No joint pain or swelling; No muscle, back or extremity pain  Psychiatric: No depression, anxiety, mood swings or difficulty sleeping  Heme/Lymph: No easy bruising or bleeding gums  Allergy and Immunologic: No hives or eczema    PAST MEDICAL & SURGICAL HISTORY:  History of COPD      Cardiac pacemaker      Lymphedema      H/O polyarthritis      H/O inguinal hernia repair          FAMILY HISTORY:  No pertinent family history in first degree relatives        SOCIAL HISTORY:  Smoking Status: [ ] Current, [ ] Former, [ ] Never  Pack Years:    MEDICATIONS:  Pulmonary:  albuterol    90 MICROgram(s) HFA Inhaler 2 Puff(s) Inhalation every 6 hours PRN  albuterol/ipratropium for Nebulization 3 milliLiter(s) Nebulizer every 6 hours  guaiFENesin Oral Liquid (Sugar-Free) 100 milliGRAM(s) Oral every 6 hours PRN  tiotropium 2.5 MICROgram(s) Inhaler 2 Puff(s) Inhalation daily    Antimicrobials:  cefTRIAXone   IVPB 1000 milliGRAM(s) IV Intermittent every 24 hours    Anticoagulants:  enoxaparin Injectable 40 milliGRAM(s) SubCutaneous every 24 hours    Onc:    GI/:  pantoprazole    Tablet 40 milliGRAM(s) Oral before breakfast    Endocrine:  atorvastatin 40 milliGRAM(s) Oral at bedtime  predniSONE   Tablet 40 milliGRAM(s) Oral every 24 hours    Cardiac:  furosemide    Tablet 40 milliGRAM(s) Oral daily  isosorbide   mononitrate ER Tablet (IMDUR) 60 milliGRAM(s) Oral daily    Other Medications:  acetaminophen     Tablet .. 650 milliGRAM(s) Oral every 6 hours PRN  ammonium lactate 12% Lotion 1 Application(s) Topical two times a day  lidocaine   4% Patch 1 Patch Transdermal daily  oxyCODONE    IR 5 milliGRAM(s) Oral every 6 hours PRN  silver sulfADIAZINE 1% Cream 1 Application(s) Topical daily  vitamin A &amp; D Ointment 1 Application(s) Topical two times a day      Allergies    No Known Allergies    Intolerances        Vital Signs Last 24 Hrs  T(C): 36.7 (2023 05:23), Max: 36.7 (2023 05:23)  T(F): 98 (2023 05:23), Max: 98 (2023 05:23)  HR: 74 (2023 05:23) (73 - 99)  BP: 121/68 (2023 05:23) (95/59 - 157/76)  BP(mean): 71 (2023 16:00) (71 - 71)  RR: 17 (2023 05:23) (17 - 19)  SpO2: 95% (2023 05:23) (95% - 98%)    Parameters below as of 2023 05:23  Patient On (Oxygen Delivery Method): nasal cannula  O2 Flow (L/min): 2      -16 @ 07:01  -  04-17 @ 07:00  --------------------------------------------------------  IN: 0 mL / OUT: 500 mL / NET: -500 mL          PHYSICAL EXAM:  GEN: NAD, well-appearing, comfortable upright/ semirecumbent in bed  HEENT: anicteric sclera; MMM  RESP: no respiratory distress, CTA B/L; no W/R/R  CV: +S1/S2, RRR, no m/g/r  GI: soft, NT/ND, +BS  EXTREM: WWP; no edema, clubbing or cyanosis  Vascular: 2+ radial pulses B/L  NEURO: alert and awake, moving limbs spontaneously    LABS:  ABG - ( 15 Apr 2023 15:09 )  pH, Arterial: 7.56  pH, Blood: x     /  pCO2: 40    /  pO2: 73    / HCO3: 36    / Base Excess: 12.4  /  SaO2: 98                  CBC Full  -  ( 2023 08:41 )  WBC Count : 12.67 K/uL  RBC Count : 4.09 M/uL  Hemoglobin : 12.5 g/dL  Hematocrit : 37.8 %  Platelet Count - Automated : 339 K/uL  Mean Cell Volume : 92.4 fl  Mean Cell Hemoglobin : 30.6 pg  Mean Cell Hemoglobin Concentration : 33.1 gm/dL  Auto Neutrophil # : 11.63 K/uL  Auto Lymphocyte # : 0.51 K/uL  Auto Monocyte # : 0.42 K/uL  Auto Eosinophil # : 0.03 K/uL  Auto Basophil # : 0.01 K/uL  Auto Neutrophil % : 91.8 %  Auto Lymphocyte % : 4.0 %  Auto Monocyte % : 3.3 %  Auto Eosinophil % : 0.2 %  Auto Basophil % : 0.1 %        139  |  98  |  30<H>  ----------------------------<  106<H>  3.8   |  34<H>  |  0.85    Ca    9.6      2023 08:41  Phos  3.0     -  Mg     2.4         TPro  8.0  /  Alb  2.8<L>  /  TBili  0.5  /  DBili  x   /  AST  14  /  ALT  53  /  AlkPhos  108  04-17          Urinalysis Basic - ( 15 Apr 2023 14:00 )    Color: Yellow / Appearance: Clear / S.015 / pH: x  Gluc: x / Ketone: Trace  / Bili: Negative / Urobili: Negative   Blood: x / Protein: 30 mg/dL / Nitrite: Negative   Leuk Esterase: Negative / RBC: 2-5 /HPF / WBC 0-2 /HPF   Sq Epi: x / Non Sq Epi: x / Bacteria: Trace /HPF                RADIOLOGY & ADDITIONAL STUDIES:  < from: Xray Chest 1 View- PORTABLE-Urgent (04.15.23 @ 13:57) >  Heart possibly enlarged.  There is a dual-chamber left-sided pacemaker.  There is a rather small left base infiltrate.  IMPRESSION: Heart enlargement and pacemaker. Small left base infiltrate. PULMONARY SERVICE INITIAL CONSULT NOTE    HPI:  Patient is a 88 y/o M from [facility], ambulates with a cane. He has PMHx of COPD on 2LPM O2 via nasal cannula, PPM, Chronic systolic HF, PPM, PVD, lymphedema. He presented with worsening shortness of breath with associated non-productive cough and whitish sputum. He also reported intermittent left sided chest pain radiating to his left axilla and palpitations. He denies any fever, headache, dizziness, abdominal pain, diarrhea, constipation. In the ED, VS was /66, HR 88, RR 20, T 100.7 (38.2), O2 92% on 2LPM via nasal cannula. Labs were unremarkable. Troponin was negatvie. EKG showed atrial-sensed ventricular paced-rhythm. As per patient, PPM was last checked 3-4 months ago. He was given ASA, Zosyn and Aztreonam. Chest pain has resolved at the time of examination.    GOC: DNR/ TRIAL OF INTUBATION (15 Apr 2023 19:10)      REVIEW OF SYSTEMS:  Constitutional: No fever, weight loss or fatigue  Eyes: No eye pain, visual disturbances, or discharge  ENMT:  No difficulty hearing, tinnitus, vertigo; No sinus or throat pain  Neck: No pain, stiffness or neck swelling  Respiratory: see HPI  Cardiovascular: No chest pain, palpitations, dizziness or leg swelling  Gastrointestinal: No abdominal or epigastric pain. No nausea, vomiting or hematemesis; No diarrhea or constipation. No melena or hematochezia.  Genitourinary: No dysuria, frequency, hematuria or incontinence  Neurological: No headaches, memory loss, loss of strength, numbness or tremors  Skin: No itching, burning, rashes or lesions   Lymph Nodes: No enlarged glands  Endocrine: No heat or cold intolerance; No hair loss  Musculoskeletal: No joint pain or swelling; No muscle, back or extremity pain  Psychiatric: No depression, anxiety, mood swings or difficulty sleeping  Heme/Lymph: No easy bruising or bleeding gums  Allergy and Immunologic: No hives or eczema    PAST MEDICAL & SURGICAL HISTORY:  History of COPD      Cardiac pacemaker      Lymphedema      H/O polyarthritis      H/O inguinal hernia repair          FAMILY HISTORY:  No pertinent family history in first degree relatives        SOCIAL HISTORY:  Smoking Status: [ ] Current, [ ] Former, [ ] Never  Pack Years:    MEDICATIONS:  Pulmonary:  albuterol    90 MICROgram(s) HFA Inhaler 2 Puff(s) Inhalation every 6 hours PRN  albuterol/ipratropium for Nebulization 3 milliLiter(s) Nebulizer every 6 hours  guaiFENesin Oral Liquid (Sugar-Free) 100 milliGRAM(s) Oral every 6 hours PRN  tiotropium 2.5 MICROgram(s) Inhaler 2 Puff(s) Inhalation daily    Antimicrobials:  cefTRIAXone   IVPB 1000 milliGRAM(s) IV Intermittent every 24 hours    Anticoagulants:  enoxaparin Injectable 40 milliGRAM(s) SubCutaneous every 24 hours    Onc:    GI/:  pantoprazole    Tablet 40 milliGRAM(s) Oral before breakfast    Endocrine:  atorvastatin 40 milliGRAM(s) Oral at bedtime  predniSONE   Tablet 40 milliGRAM(s) Oral every 24 hours    Cardiac:  furosemide    Tablet 40 milliGRAM(s) Oral daily  isosorbide   mononitrate ER Tablet (IMDUR) 60 milliGRAM(s) Oral daily    Other Medications:  acetaminophen     Tablet .. 650 milliGRAM(s) Oral every 6 hours PRN  ammonium lactate 12% Lotion 1 Application(s) Topical two times a day  lidocaine   4% Patch 1 Patch Transdermal daily  oxyCODONE    IR 5 milliGRAM(s) Oral every 6 hours PRN  silver sulfADIAZINE 1% Cream 1 Application(s) Topical daily  vitamin A &amp; D Ointment 1 Application(s) Topical two times a day      Allergies    No Known Allergies    Intolerances        Vital Signs Last 24 Hrs  T(C): 36.7 (2023 05:23), Max: 36.7 (2023 05:23)  T(F): 98 (2023 05:23), Max: 98 (2023 05:23)  HR: 74 (2023 05:23) (73 - 99)  BP: 121/68 (2023 05:23) (95/59 - 157/76)  BP(mean): 71 (2023 16:00) (71 - 71)  RR: 17 (2023 05:23) (17 - 19)  SpO2: 95% (2023 05:23) (95% - 98%)    Parameters below as of 2023 05:23  Patient On (Oxygen Delivery Method): nasal cannula  O2 Flow (L/min): 2      -16 @ 07:01  -  04-17 @ 07:00  --------------------------------------------------------  IN: 0 mL / OUT: 500 mL / NET: -500 mL          PHYSICAL EXAM:  GEN: NAD, well-appearing, comfortable upright/ semirecumbent in bed  HEENT: anicteric sclera; MMM  RESP: no respiratory distress, CTA B/L; no W/R/R  CV: +S1/S2, RRR, no m/g/r  GI: soft, NT/ND, +BS  EXTREM: WWP; no edema, clubbing or cyanosis  Vascular: 2+ radial pulses B/L  NEURO: alert and awake, moving limbs spontaneously    LABS:  ABG - ( 15 Apr 2023 15:09 )  pH, Arterial: 7.56  pH, Blood: x     /  pCO2: 40    /  pO2: 73    / HCO3: 36    / Base Excess: 12.4  /  SaO2: 98                  CBC Full  -  ( 2023 08:41 )  WBC Count : 12.67 K/uL  RBC Count : 4.09 M/uL  Hemoglobin : 12.5 g/dL  Hematocrit : 37.8 %  Platelet Count - Automated : 339 K/uL  Mean Cell Volume : 92.4 fl  Mean Cell Hemoglobin : 30.6 pg  Mean Cell Hemoglobin Concentration : 33.1 gm/dL  Auto Neutrophil # : 11.63 K/uL  Auto Lymphocyte # : 0.51 K/uL  Auto Monocyte # : 0.42 K/uL  Auto Eosinophil # : 0.03 K/uL  Auto Basophil # : 0.01 K/uL  Auto Neutrophil % : 91.8 %  Auto Lymphocyte % : 4.0 %  Auto Monocyte % : 3.3 %  Auto Eosinophil % : 0.2 %  Auto Basophil % : 0.1 %        139  |  98  |  30<H>  ----------------------------<  106<H>  3.8   |  34<H>  |  0.85    Ca    9.6      2023 08:41  Phos  3.0     -  Mg     2.4         TPro  8.0  /  Alb  2.8<L>  /  TBili  0.5  /  DBili  x   /  AST  14  /  ALT  53  /  AlkPhos  108  04-17          Urinalysis Basic - ( 15 Apr 2023 14:00 )    Color: Yellow / Appearance: Clear / S.015 / pH: x  Gluc: x / Ketone: Trace  / Bili: Negative / Urobili: Negative   Blood: x / Protein: 30 mg/dL / Nitrite: Negative   Leuk Esterase: Negative / RBC: 2-5 /HPF / WBC 0-2 /HPF   Sq Epi: x / Non Sq Epi: x / Bacteria: Trace /HPF                RADIOLOGY & ADDITIONAL STUDIES:  < from: Xray Chest 1 View- PORTABLE-Urgent (04.15.23 @ 13:57) >  Heart possibly enlarged.  There is a dual-chamber left-sided pacemaker.  There is a rather small left base infiltrate.  IMPRESSION: Heart enlargement and pacemaker. Small left base infiltrate. PULMONARY SERVICE INITIAL CONSULT NOTE    HPI:  Patient is a 88 y/o M from [facility], ambulates with a cane. He has PMHx of COPD on 2LPM O2 via nasal cannula, PPM, Chronic systolic HF, PPM, PVD, lymphedema. He presented with worsening shortness of breath with associated non-productive cough and whitish sputum. He also reported intermittent left sided chest pain radiating to his left axilla and palpitations. He denies any fever, headache, dizziness, abdominal pain, diarrhea, constipation. In the ED, VS was /66, HR 88, RR 20, T 100.7 (38.2), O2 92% on 2LPM via nasal cannula. Labs were unremarkable. Troponin was negatvie. EKG showed atrial-sensed ventricular paced-rhythm. As per patient, PPM was last checked 3-4 months ago. He was given ASA, Zosyn and Aztreonam. Chest pain has resolved at the time of examination.    GOC: DNR/ TRIAL OF INTUBATION (15 Apr 2023 19:10)      REVIEW OF SYSTEMS:  Constitutional: No fever, weight loss or fatigue  Eyes: No eye pain, visual disturbances, or discharge  ENMT:  No difficulty hearing, tinnitus, vertigo; No sinus or throat pain  Neck: No pain, stiffness or neck swelling  Respiratory: see HPI  Cardiovascular: No chest pain, palpitations, dizziness or leg swelling  Gastrointestinal: No abdominal or epigastric pain. No nausea, vomiting or hematemesis; No diarrhea or constipation. No melena or hematochezia.  Genitourinary: No dysuria, frequency, hematuria or incontinence  Neurological: No headaches, memory loss, loss of strength, numbness or tremors  Skin: No itching, burning, rashes or lesions   Lymph Nodes: No enlarged glands  Endocrine: No heat or cold intolerance; No hair loss  Musculoskeletal: No joint pain or swelling; No muscle, back or extremity pain  Psychiatric: No depression, anxiety, mood swings or difficulty sleeping  Heme/Lymph: No easy bruising or bleeding gums  Allergy and Immunologic: No hives or eczema    PAST MEDICAL & SURGICAL HISTORY:  History of COPD      Cardiac pacemaker      Lymphedema      H/O polyarthritis      H/O inguinal hernia repair          FAMILY HISTORY:  No pertinent family history in first degree relatives        SOCIAL HISTORY:  Smoking Status: [ ] Current, [ ] Former, [ ] Never  Pack Years:    MEDICATIONS:  Pulmonary:  albuterol    90 MICROgram(s) HFA Inhaler 2 Puff(s) Inhalation every 6 hours PRN  albuterol/ipratropium for Nebulization 3 milliLiter(s) Nebulizer every 6 hours  guaiFENesin Oral Liquid (Sugar-Free) 100 milliGRAM(s) Oral every 6 hours PRN  tiotropium 2.5 MICROgram(s) Inhaler 2 Puff(s) Inhalation daily    Antimicrobials:  cefTRIAXone   IVPB 1000 milliGRAM(s) IV Intermittent every 24 hours    Anticoagulants:  enoxaparin Injectable 40 milliGRAM(s) SubCutaneous every 24 hours    Onc:    GI/:  pantoprazole    Tablet 40 milliGRAM(s) Oral before breakfast    Endocrine:  atorvastatin 40 milliGRAM(s) Oral at bedtime  predniSONE   Tablet 40 milliGRAM(s) Oral every 24 hours    Cardiac:  furosemide    Tablet 40 milliGRAM(s) Oral daily  isosorbide   mononitrate ER Tablet (IMDUR) 60 milliGRAM(s) Oral daily    Other Medications:  acetaminophen     Tablet .. 650 milliGRAM(s) Oral every 6 hours PRN  ammonium lactate 12% Lotion 1 Application(s) Topical two times a day  lidocaine   4% Patch 1 Patch Transdermal daily  oxyCODONE    IR 5 milliGRAM(s) Oral every 6 hours PRN  silver sulfADIAZINE 1% Cream 1 Application(s) Topical daily  vitamin A &amp; D Ointment 1 Application(s) Topical two times a day      Allergies    No Known Allergies    Intolerances        Vital Signs Last 24 Hrs  T(C): 36.7 (2023 05:23), Max: 36.7 (2023 05:23)  T(F): 98 (2023 05:23), Max: 98 (2023 05:23)  HR: 74 (2023 05:23) (73 - 99)  BP: 121/68 (2023 05:23) (95/59 - 157/76)  BP(mean): 71 (2023 16:00) (71 - 71)  RR: 17 (2023 05:23) (17 - 19)  SpO2: 95% (2023 05:23) (95% - 98%)    Parameters below as of 2023 05:23  Patient On (Oxygen Delivery Method): nasal cannula  O2 Flow (L/min): 2      -16 @ 07:01  -  04-17 @ 07:00  --------------------------------------------------------  IN: 0 mL / OUT: 500 mL / NET: -500 mL          PHYSICAL EXAM:  GEN: NAD, well-appearing, comfortable upright/ semirecumbent in bed  HEENT: anicteric sclera; MMM  RESP: no respiratory distress, CTA B/L; no W/R/R  CV: +S1/S2, RRR, no m/g/r  GI: soft, NT/ND, +BS  EXTREM: WWP; no edema, clubbing or cyanosis  Vascular: 2+ radial pulses B/L  NEURO: alert and awake, moving limbs spontaneously    LABS:  ABG - ( 15 Apr 2023 15:09 )  pH, Arterial: 7.56  pH, Blood: x     /  pCO2: 40    /  pO2: 73    / HCO3: 36    / Base Excess: 12.4  /  SaO2: 98                  CBC Full  -  ( 2023 08:41 )  WBC Count : 12.67 K/uL  RBC Count : 4.09 M/uL  Hemoglobin : 12.5 g/dL  Hematocrit : 37.8 %  Platelet Count - Automated : 339 K/uL  Mean Cell Volume : 92.4 fl  Mean Cell Hemoglobin : 30.6 pg  Mean Cell Hemoglobin Concentration : 33.1 gm/dL  Auto Neutrophil # : 11.63 K/uL  Auto Lymphocyte # : 0.51 K/uL  Auto Monocyte # : 0.42 K/uL  Auto Eosinophil # : 0.03 K/uL  Auto Basophil # : 0.01 K/uL  Auto Neutrophil % : 91.8 %  Auto Lymphocyte % : 4.0 %  Auto Monocyte % : 3.3 %  Auto Eosinophil % : 0.2 %  Auto Basophil % : 0.1 %        139  |  98  |  30<H>  ----------------------------<  106<H>  3.8   |  34<H>  |  0.85    Ca    9.6      2023 08:41  Phos  3.0     -  Mg     2.4         TPro  8.0  /  Alb  2.8<L>  /  TBili  0.5  /  DBili  x   /  AST  14  /  ALT  53  /  AlkPhos  108  04-17          Urinalysis Basic - ( 15 Apr 2023 14:00 )    Color: Yellow / Appearance: Clear / S.015 / pH: x  Gluc: x / Ketone: Trace  / Bili: Negative / Urobili: Negative   Blood: x / Protein: 30 mg/dL / Nitrite: Negative   Leuk Esterase: Negative / RBC: 2-5 /HPF / WBC 0-2 /HPF   Sq Epi: x / Non Sq Epi: x / Bacteria: Trace /HPF                RADIOLOGY & ADDITIONAL STUDIES:  < from: Xray Chest 1 View- PORTABLE-Urgent (04.15.23 @ 13:57) >  Heart possibly enlarged.  There is a dual-chamber left-sided pacemaker.  There is a rather small left base infiltrate.  IMPRESSION: Heart enlargement and pacemaker. Small left base infiltrate. PULMONARY SERVICE INITIAL CONSULT NOTE    HPI:  Patient is a 88 y/o M from [facility], former smoker (50y x 1ppd, quit ~25y ago). He has PMHx of COPD on 2LPM O2 via nasal cannula, PPM, Chronic systolic HF, PPM, PVD, lymphedema. He presented with worsening shortness of breath with associated non-productive cough and whitish sputum. He has chronic cough and dyspnea but sx were worsening for several days prior to admission. He also reported intermittent left sided chest pain radiating to his left axilla and palpitations. In the ED, VS was /66, HR 88, RR 20, T 100.7 (38.2), O2 92% on 2LPM via nasal cannula. Labs were unremarkable. Troponin was negative, BNP wnl. EKG showed atrial-sensed ventricular paced-rhythm. CXR demonstrated L retrocardiac opacity. Pulmonary consulted for possible COPD exacerbation/pneumonia. On exam today pt reported feeling improved. Continued to have intermittent cough with scant sputum, decreased from admission. Feels it is slightly difficult to clear secretions. Atypical CP resolved. He noted wheezing before admission as well, now resolved. At this time pt denies anginal/pleuritic CP, SOB at rest, wheezing, nasal congestion, stridor, orthopnea, hemoptysis, LE edema, sick contacts, recent travel, dysphagia or overt aspiration events, f/c/n/v.     REVIEW OF SYSTEMS:  Constitutional: No fever, weight loss or fatigue  Eyes: No eye pain, visual disturbances, or discharge  ENMT:  No difficulty hearing, tinnitus, vertigo; No sinus or throat pain  Neck: +mild stiffness he attributes to sleeping poorly  Respiratory: see HPI  Cardiovascular: No chest pain, palpitations, dizziness or leg swelling  Gastrointestinal: No abdominal or epigastric pain. No nausea, vomiting or hematemesis; No diarrhea or constipation. No melena or hematochezia.  Genitourinary: No dysuria, frequency, hematuria or incontinence  Neurological: No headaches, memory loss, loss of strength, numbness or tremors  Skin: No itching, burning, rashes or lesions   Lymph Nodes: No enlarged glands  Endocrine: No heat or cold intolerance; No hair loss  Musculoskeletal: No joint pain or swelling; No muscle, back or extremity pain  Psychiatric: No depression, anxiety, mood swings or difficulty sleeping  Heme/Lymph: No easy bruising or bleeding gums  Allergy and Immunologic: No hives or eczema    PAST MEDICAL & SURGICAL HISTORY:  History of COPD      Cardiac pacemaker      Lymphedema      H/O polyarthritis      H/O inguinal hernia repair          FAMILY HISTORY:  No pertinent family history in first degree relatives        SOCIAL HISTORY:  Smoking Status: [ ] Current, [x] Former, [ ] Never  Pack Years: 50    MEDICATIONS:  Pulmonary:  albuterol    90 MICROgram(s) HFA Inhaler 2 Puff(s) Inhalation every 6 hours PRN  albuterol/ipratropium for Nebulization 3 milliLiter(s) Nebulizer every 6 hours  guaiFENesin Oral Liquid (Sugar-Free) 100 milliGRAM(s) Oral every 6 hours PRN  tiotropium 2.5 MICROgram(s) Inhaler 2 Puff(s) Inhalation daily    Antimicrobials:  cefTRIAXone   IVPB 1000 milliGRAM(s) IV Intermittent every 24 hours    Anticoagulants:  enoxaparin Injectable 40 milliGRAM(s) SubCutaneous every 24 hours    Onc:    GI/:  pantoprazole    Tablet 40 milliGRAM(s) Oral before breakfast    Endocrine:  atorvastatin 40 milliGRAM(s) Oral at bedtime  predniSONE   Tablet 40 milliGRAM(s) Oral every 24 hours    Cardiac:  furosemide    Tablet 40 milliGRAM(s) Oral daily  isosorbide   mononitrate ER Tablet (IMDUR) 60 milliGRAM(s) Oral daily    Other Medications:  acetaminophen     Tablet .. 650 milliGRAM(s) Oral every 6 hours PRN  ammonium lactate 12% Lotion 1 Application(s) Topical two times a day  lidocaine   4% Patch 1 Patch Transdermal daily  oxyCODONE    IR 5 milliGRAM(s) Oral every 6 hours PRN  silver sulfADIAZINE 1% Cream 1 Application(s) Topical daily  vitamin A &amp; D Ointment 1 Application(s) Topical two times a day      Allergies    No Known Allergies    Intolerances        Vital Signs Last 24 Hrs  T(C): 36.7 (2023 05:23), Max: 36.7 (2023 05:23)  T(F): 98 (2023 05:23), Max: 98 (2023 05:23)  HR: 74 (2023 05:23) (73 - 99)  BP: 121/68 (2023 05:23) (95/59 - 157/76)  BP(mean): 71 (2023 16:00) (71 - 71)  RR: 17 (2023 05:23) (17 - 19)  SpO2: 95% (2023 05:23) (95% - 98%)    Parameters below as of 2023 05:23  Patient On (Oxygen Delivery Method): nasal cannula  O2 Flow (L/min): 2      -16 @ 07:01  -  04-17 @ 07:00  --------------------------------------------------------  IN: 0 mL / OUT: 500 mL / NET: -500 mL          PHYSICAL EXAM:  GEN: NAD, well-appearing, comfortable upright in chair at bedside  HEENT: anicteric sclera; MMM, hyperpigmented growths on nose  RESP: no respiratory distress, CTA B/L; no W/R/R  CV: +S1/S2, RRR, no m/g/r  GI: soft, NT/ND, +BS  EXTREM: WWP; no edema, clubbing or cyanosis, chronic venous stasis dermatitis bilateral LEs  Vascular: 2+ radial pulses B/L  NEURO: alert and awake, moving limbs spontaneously    LABS:  ABG - ( 15 Apr 2023 15:09 )  pH, Arterial: 7.56  pH, Blood: x     /  pCO2: 40    /  pO2: 73    / HCO3: 36    / Base Excess: 12.4  /  SaO2: 98                  CBC Full  -  ( 2023 08:41 )  WBC Count : 12.67 K/uL  RBC Count : 4.09 M/uL  Hemoglobin : 12.5 g/dL  Hematocrit : 37.8 %  Platelet Count - Automated : 339 K/uL  Mean Cell Volume : 92.4 fl  Mean Cell Hemoglobin : 30.6 pg  Mean Cell Hemoglobin Concentration : 33.1 gm/dL  Auto Neutrophil # : 11.63 K/uL  Auto Lymphocyte # : 0.51 K/uL  Auto Monocyte # : 0.42 K/uL  Auto Eosinophil # : 0.03 K/uL  Auto Basophil # : 0.01 K/uL  Auto Neutrophil % : 91.8 %  Auto Lymphocyte % : 4.0 %  Auto Monocyte % : 3.3 %  Auto Eosinophil % : 0.2 %  Auto Basophil % : 0.1 %        139  |  98  |  30<H>  ----------------------------<  106<H>  3.8   |  34<H>  |  0.85    Ca    9.6      2023 08:41  Phos  3.0       Mg     2.4         TPro  8.0  /  Alb  2.8<L>  /  TBili  0.5  /  DBili  x   /  AST  14  /  ALT  53  /  AlkPhos  108            Urinalysis Basic - ( 15 Apr 2023 14:00 )    Color: Yellow / Appearance: Clear / S.015 / pH: x  Gluc: x / Ketone: Trace  / Bili: Negative / Urobili: Negative   Blood: x / Protein: 30 mg/dL / Nitrite: Negative   Leuk Esterase: Negative / RBC: 2-5 /HPF / WBC 0-2 /HPF   Sq Epi: x / Non Sq Epi: x / Bacteria: Trace /HPF                RADIOLOGY & ADDITIONAL STUDIES:  < from: Xray Chest 1 View- PORTABLE-Urgent (04.15.23 @ 13:57) >  Heart possibly enlarged.  There is a dual-chamber left-sided pacemaker.  There is a rather small left base infiltrate.  IMPRESSION: Heart enlargement and pacemaker. Small left base infiltrate. PULMONARY SERVICE INITIAL CONSULT NOTE    HPI:  Patient is a 86 y/o M from [facility], former smoker (50y x 1ppd, quit ~25y ago). He has PMHx of COPD on 2LPM O2 via nasal cannula, PPM, Chronic systolic HF, PPM, PVD, lymphedema. He presented with worsening shortness of breath with associated non-productive cough and whitish sputum. He has chronic cough and dyspnea but sx were worsening for several days prior to admission. He also reported intermittent left sided chest pain radiating to his left axilla and palpitations. In the ED, VS was /66, HR 88, RR 20, T 100.7 (38.2), O2 92% on 2LPM via nasal cannula. Labs were unremarkable. Troponin was negative, BNP wnl. EKG showed atrial-sensed ventricular paced-rhythm. CXR demonstrated L retrocardiac opacity. Pulmonary consulted for possible COPD exacerbation/pneumonia. On exam today pt reported feeling improved. Continued to have intermittent cough with scant sputum, decreased from admission. Feels it is slightly difficult to clear secretions. Atypical CP resolved. He noted wheezing before admission as well, now resolved. At this time pt denies anginal/pleuritic CP, SOB at rest, wheezing, nasal congestion, stridor, orthopnea, hemoptysis, LE edema, sick contacts, recent travel, dysphagia or overt aspiration events, f/c/n/v.     REVIEW OF SYSTEMS:  Constitutional: No fever, weight loss or fatigue  Eyes: No eye pain, visual disturbances, or discharge  ENMT:  No difficulty hearing, tinnitus, vertigo; No sinus or throat pain  Neck: +mild stiffness he attributes to sleeping poorly  Respiratory: see HPI  Cardiovascular: No chest pain, palpitations, dizziness or leg swelling  Gastrointestinal: No abdominal or epigastric pain. No nausea, vomiting or hematemesis; No diarrhea or constipation. No melena or hematochezia.  Genitourinary: No dysuria, frequency, hematuria or incontinence  Neurological: No headaches, memory loss, loss of strength, numbness or tremors  Skin: No itching, burning, rashes or lesions   Lymph Nodes: No enlarged glands  Endocrine: No heat or cold intolerance; No hair loss  Musculoskeletal: No joint pain or swelling; No muscle, back or extremity pain  Psychiatric: No depression, anxiety, mood swings or difficulty sleeping  Heme/Lymph: No easy bruising or bleeding gums  Allergy and Immunologic: No hives or eczema    PAST MEDICAL & SURGICAL HISTORY:  History of COPD      Cardiac pacemaker      Lymphedema      H/O polyarthritis      H/O inguinal hernia repair          FAMILY HISTORY:  No pertinent family history in first degree relatives        SOCIAL HISTORY:  Smoking Status: [ ] Current, [x] Former, [ ] Never  Pack Years: 50    MEDICATIONS:  Pulmonary:  albuterol    90 MICROgram(s) HFA Inhaler 2 Puff(s) Inhalation every 6 hours PRN  albuterol/ipratropium for Nebulization 3 milliLiter(s) Nebulizer every 6 hours  guaiFENesin Oral Liquid (Sugar-Free) 100 milliGRAM(s) Oral every 6 hours PRN  tiotropium 2.5 MICROgram(s) Inhaler 2 Puff(s) Inhalation daily    Antimicrobials:  cefTRIAXone   IVPB 1000 milliGRAM(s) IV Intermittent every 24 hours    Anticoagulants:  enoxaparin Injectable 40 milliGRAM(s) SubCutaneous every 24 hours    Onc:    GI/:  pantoprazole    Tablet 40 milliGRAM(s) Oral before breakfast    Endocrine:  atorvastatin 40 milliGRAM(s) Oral at bedtime  predniSONE   Tablet 40 milliGRAM(s) Oral every 24 hours    Cardiac:  furosemide    Tablet 40 milliGRAM(s) Oral daily  isosorbide   mononitrate ER Tablet (IMDUR) 60 milliGRAM(s) Oral daily    Other Medications:  acetaminophen     Tablet .. 650 milliGRAM(s) Oral every 6 hours PRN  ammonium lactate 12% Lotion 1 Application(s) Topical two times a day  lidocaine   4% Patch 1 Patch Transdermal daily  oxyCODONE    IR 5 milliGRAM(s) Oral every 6 hours PRN  silver sulfADIAZINE 1% Cream 1 Application(s) Topical daily  vitamin A &amp; D Ointment 1 Application(s) Topical two times a day      Allergies    No Known Allergies    Intolerances        Vital Signs Last 24 Hrs  T(C): 36.7 (2023 05:23), Max: 36.7 (2023 05:23)  T(F): 98 (2023 05:23), Max: 98 (2023 05:23)  HR: 74 (2023 05:23) (73 - 99)  BP: 121/68 (2023 05:23) (95/59 - 157/76)  BP(mean): 71 (2023 16:00) (71 - 71)  RR: 17 (2023 05:23) (17 - 19)  SpO2: 95% (2023 05:23) (95% - 98%)    Parameters below as of 2023 05:23  Patient On (Oxygen Delivery Method): nasal cannula  O2 Flow (L/min): 2      -16 @ 07:01  -  04-17 @ 07:00  --------------------------------------------------------  IN: 0 mL / OUT: 500 mL / NET: -500 mL          PHYSICAL EXAM:  GEN: NAD, well-appearing, comfortable upright in chair at bedside  HEENT: anicteric sclera; MMM, hyperpigmented growths on nose  RESP: no respiratory distress, CTA B/L; no W/R/R  CV: +S1/S2, RRR, no m/g/r  GI: soft, NT/ND, +BS  EXTREM: WWP; no edema, clubbing or cyanosis, chronic venous stasis dermatitis bilateral LEs  Vascular: 2+ radial pulses B/L  NEURO: alert and awake, moving limbs spontaneously    LABS:  ABG - ( 15 Apr 2023 15:09 )  pH, Arterial: 7.56  pH, Blood: x     /  pCO2: 40    /  pO2: 73    / HCO3: 36    / Base Excess: 12.4  /  SaO2: 98                  CBC Full  -  ( 2023 08:41 )  WBC Count : 12.67 K/uL  RBC Count : 4.09 M/uL  Hemoglobin : 12.5 g/dL  Hematocrit : 37.8 %  Platelet Count - Automated : 339 K/uL  Mean Cell Volume : 92.4 fl  Mean Cell Hemoglobin : 30.6 pg  Mean Cell Hemoglobin Concentration : 33.1 gm/dL  Auto Neutrophil # : 11.63 K/uL  Auto Lymphocyte # : 0.51 K/uL  Auto Monocyte # : 0.42 K/uL  Auto Eosinophil # : 0.03 K/uL  Auto Basophil # : 0.01 K/uL  Auto Neutrophil % : 91.8 %  Auto Lymphocyte % : 4.0 %  Auto Monocyte % : 3.3 %  Auto Eosinophil % : 0.2 %  Auto Basophil % : 0.1 %        139  |  98  |  30<H>  ----------------------------<  106<H>  3.8   |  34<H>  |  0.85    Ca    9.6      2023 08:41  Phos  3.0       Mg     2.4         TPro  8.0  /  Alb  2.8<L>  /  TBili  0.5  /  DBili  x   /  AST  14  /  ALT  53  /  AlkPhos  108            Urinalysis Basic - ( 15 Apr 2023 14:00 )    Color: Yellow / Appearance: Clear / S.015 / pH: x  Gluc: x / Ketone: Trace  / Bili: Negative / Urobili: Negative   Blood: x / Protein: 30 mg/dL / Nitrite: Negative   Leuk Esterase: Negative / RBC: 2-5 /HPF / WBC 0-2 /HPF   Sq Epi: x / Non Sq Epi: x / Bacteria: Trace /HPF                RADIOLOGY & ADDITIONAL STUDIES:  < from: Xray Chest 1 View- PORTABLE-Urgent (04.15.23 @ 13:57) >  Heart possibly enlarged.  There is a dual-chamber left-sided pacemaker.  There is a rather small left base infiltrate.  IMPRESSION: Heart enlargement and pacemaker. Small left base infiltrate. PULMONARY SERVICE INITIAL CONSULT NOTE    HPI:  Patient is a 86 y/o M from [facility], former smoker (50y x 1ppd, quit ~25y ago). He has PMHx of COPD on 2LPM O2 via nasal cannula, PPM, Chronic systolic HF, PPM, PVD, lymphedema. He presented with worsening shortness of breath with associated non-productive cough and whitish sputum. He has chronic cough and dyspnea but sx were worsening for several days prior to admission. He also reported intermittent left sided chest pain radiating to his left axilla and palpitations. In the ED, VS was /66, HR 88, RR 20, T 100.7 (38.2), O2 92% on 2LPM via nasal cannula. Labs were unremarkable. Troponin was negative, BNP wnl. EKG showed atrial-sensed ventricular paced-rhythm. CXR demonstrated L retrocardiac opacity. Pulmonary consulted for possible COPD exacerbation/pneumonia. On exam today pt reported feeling improved. Continued to have intermittent cough with scant sputum, decreased from admission. Feels it is slightly difficult to clear secretions. Atypical CP resolved. He noted wheezing before admission as well, now resolved. At this time pt denies anginal/pleuritic CP, SOB at rest, wheezing, nasal congestion, stridor, orthopnea, hemoptysis, LE edema, sick contacts, recent travel, dysphagia or overt aspiration events, f/c/n/v.     REVIEW OF SYSTEMS:  Constitutional: No fever, weight loss or fatigue  Eyes: No eye pain, visual disturbances, or discharge  ENMT:  No difficulty hearing, tinnitus, vertigo; No sinus or throat pain  Neck: +mild stiffness he attributes to sleeping poorly  Respiratory: see HPI  Cardiovascular: No chest pain, palpitations, dizziness or leg swelling  Gastrointestinal: No abdominal or epigastric pain. No nausea, vomiting or hematemesis; No diarrhea or constipation. No melena or hematochezia.  Genitourinary: No dysuria, frequency, hematuria or incontinence  Neurological: No headaches, memory loss, loss of strength, numbness or tremors  Skin: No itching, burning, rashes or lesions   Lymph Nodes: No enlarged glands  Endocrine: No heat or cold intolerance; No hair loss  Musculoskeletal: No joint pain or swelling; No muscle, back or extremity pain  Psychiatric: No depression, anxiety, mood swings or difficulty sleeping  Heme/Lymph: No easy bruising or bleeding gums  Allergy and Immunologic: No hives or eczema    PAST MEDICAL & SURGICAL HISTORY:  History of COPD      Cardiac pacemaker      Lymphedema      H/O polyarthritis      H/O inguinal hernia repair          FAMILY HISTORY:  No pertinent family history in first degree relatives        SOCIAL HISTORY:  Smoking Status: [ ] Current, [x] Former, [ ] Never  Pack Years: 50    MEDICATIONS:  Pulmonary:  albuterol    90 MICROgram(s) HFA Inhaler 2 Puff(s) Inhalation every 6 hours PRN  albuterol/ipratropium for Nebulization 3 milliLiter(s) Nebulizer every 6 hours  guaiFENesin Oral Liquid (Sugar-Free) 100 milliGRAM(s) Oral every 6 hours PRN  tiotropium 2.5 MICROgram(s) Inhaler 2 Puff(s) Inhalation daily    Antimicrobials:  cefTRIAXone   IVPB 1000 milliGRAM(s) IV Intermittent every 24 hours    Anticoagulants:  enoxaparin Injectable 40 milliGRAM(s) SubCutaneous every 24 hours    Onc:    GI/:  pantoprazole    Tablet 40 milliGRAM(s) Oral before breakfast    Endocrine:  atorvastatin 40 milliGRAM(s) Oral at bedtime  predniSONE   Tablet 40 milliGRAM(s) Oral every 24 hours    Cardiac:  furosemide    Tablet 40 milliGRAM(s) Oral daily  isosorbide   mononitrate ER Tablet (IMDUR) 60 milliGRAM(s) Oral daily    Other Medications:  acetaminophen     Tablet .. 650 milliGRAM(s) Oral every 6 hours PRN  ammonium lactate 12% Lotion 1 Application(s) Topical two times a day  lidocaine   4% Patch 1 Patch Transdermal daily  oxyCODONE    IR 5 milliGRAM(s) Oral every 6 hours PRN  silver sulfADIAZINE 1% Cream 1 Application(s) Topical daily  vitamin A &amp; D Ointment 1 Application(s) Topical two times a day      Allergies    No Known Allergies    Intolerances        Vital Signs Last 24 Hrs  T(C): 36.7 (2023 05:23), Max: 36.7 (2023 05:23)  T(F): 98 (2023 05:23), Max: 98 (2023 05:23)  HR: 74 (2023 05:23) (73 - 99)  BP: 121/68 (2023 05:23) (95/59 - 157/76)  BP(mean): 71 (2023 16:00) (71 - 71)  RR: 17 (2023 05:23) (17 - 19)  SpO2: 95% (2023 05:23) (95% - 98%)    Parameters below as of 2023 05:23  Patient On (Oxygen Delivery Method): nasal cannula  O2 Flow (L/min): 2      -16 @ 07:01  -  04-17 @ 07:00  --------------------------------------------------------  IN: 0 mL / OUT: 500 mL / NET: -500 mL          PHYSICAL EXAM:  GEN: NAD, well-appearing, comfortable upright in chair at bedside  HEENT: anicteric sclera; MMM, hyperpigmented growths on nose  RESP: no respiratory distress, CTA B/L; no W/R/R  CV: +S1/S2, RRR, no m/g/r  GI: soft, NT/ND, +BS  EXTREM: WWP; no edema, clubbing or cyanosis, chronic venous stasis dermatitis bilateral LEs  Vascular: 2+ radial pulses B/L  NEURO: alert and awake, moving limbs spontaneously    LABS:  ABG - ( 15 Apr 2023 15:09 )  pH, Arterial: 7.56  pH, Blood: x     /  pCO2: 40    /  pO2: 73    / HCO3: 36    / Base Excess: 12.4  /  SaO2: 98                  CBC Full  -  ( 2023 08:41 )  WBC Count : 12.67 K/uL  RBC Count : 4.09 M/uL  Hemoglobin : 12.5 g/dL  Hematocrit : 37.8 %  Platelet Count - Automated : 339 K/uL  Mean Cell Volume : 92.4 fl  Mean Cell Hemoglobin : 30.6 pg  Mean Cell Hemoglobin Concentration : 33.1 gm/dL  Auto Neutrophil # : 11.63 K/uL  Auto Lymphocyte # : 0.51 K/uL  Auto Monocyte # : 0.42 K/uL  Auto Eosinophil # : 0.03 K/uL  Auto Basophil # : 0.01 K/uL  Auto Neutrophil % : 91.8 %  Auto Lymphocyte % : 4.0 %  Auto Monocyte % : 3.3 %  Auto Eosinophil % : 0.2 %  Auto Basophil % : 0.1 %        139  |  98  |  30<H>  ----------------------------<  106<H>  3.8   |  34<H>  |  0.85    Ca    9.6      2023 08:41  Phos  3.0       Mg     2.4         TPro  8.0  /  Alb  2.8<L>  /  TBili  0.5  /  DBili  x   /  AST  14  /  ALT  53  /  AlkPhos  108            Urinalysis Basic - ( 15 Apr 2023 14:00 )    Color: Yellow / Appearance: Clear / S.015 / pH: x  Gluc: x / Ketone: Trace  / Bili: Negative / Urobili: Negative   Blood: x / Protein: 30 mg/dL / Nitrite: Negative   Leuk Esterase: Negative / RBC: 2-5 /HPF / WBC 0-2 /HPF   Sq Epi: x / Non Sq Epi: x / Bacteria: Trace /HPF                RADIOLOGY & ADDITIONAL STUDIES:  < from: Xray Chest 1 View- PORTABLE-Urgent (04.15.23 @ 13:57) >  Heart possibly enlarged.  There is a dual-chamber left-sided pacemaker.  There is a rather small left base infiltrate.  IMPRESSION: Heart enlargement and pacemaker. Small left base infiltrate.

## 2023-04-17 NOTE — DISCHARGE NOTE PROVIDER - NSDCCPCAREPLAN_GEN_ALL_CORE_FT
PRINCIPAL DISCHARGE DIAGNOSIS  Diagnosis: COPD exacerbation  Assessment and Plan of Treatment: You came to the hospital with worsening shortness of breath, increased sputum production and cough. You were admitted for COPD exacerbation secondary to Pneumonia. Your Chest X-ray showed a small left base infiltrate. You were treated with AZITHROMAX and ROCEPHIN IV, ORAL STEROIDS, SYMBICORT, ALBUTEROL, SPIRIVA with subsequent improvement of symptoms. Please take medications CEFPODOXIME 200 MG ORAL TWICE A DAY AND PREDNISONE 40 MG ORAL ONCE DAILY FROM 4/18/23 TO 4/19/23 ***BOTH MEDICATIONS*** and follow up with your PCP and/or Pulmonologist gabriel a week from discharge to adjust medications as needed.      SECONDARY DISCHARGE DIAGNOSES  Diagnosis: PNA (pneumonia)  Assessment and Plan of Treatment: You came to the hospital with worsening shortness of breath, increased sputum production and cough. You were admitted for COPD exacerbation secondary to Pneumonia. Your Chest X-ray showed a small left base infiltrate. You were treated with AZITHROMAX and ROCEPHIN IV, ORAL STEROIDS, SYMBICORT, ALBUTEROL, SPIRIVA with subsequent improvement of symptoms. Please take medications CEFPODOXIME 200 MG ORAL TWICE A DAY AND PREDNISONE 40 MG ORAL ONCE DAILY FROM 4/18/23 TO 4/19/23 ***BOTH MEDICATIONS*** and follow up with your PCP and/or Pulmonologist gabriel a week from discharge to adjust medications as needed.    Diagnosis: Chronic systolic heart failure  Assessment and Plan of Treatment: You have history of CHF (congestive heart failure). On this admission you were continued on LASIX and IMDUR. Please weigh yourself daily and If you gain 3lbs in 3 days, or 5lbs in a week and /or have any swelling or increased swelling in your feet, ankles, and/or stomach call your Health Care Provider. Do not eat or drink foods containing more than 2000 mg of salt (sodium) in your diet every day and limit fluids to 800cc to 1000 cc per day. Please take your medication as prescribed and follow up with your PCP/Cardiologist in 1 week from discharge to adjust medications as needed.

## 2023-04-17 NOTE — DISCHARGE NOTE PROVIDER - NSDCPNSUBOBJ_GEN_ALL_CORE
ATTENDING:    Patient seen and examined at bedside. No acute events overnight. Breathing improved and patient without CP. Discussed with his OP LISSA Wren. Recent cardiac work up at [facility]. Will further manage him at [facility] ATTENDING:    Patient seen and examined at bedside. No acute events overnight. Breathing improved and patient without CP. Discussed with his OP LISSA Wren. Recent cardiac work up at [facility]. Will further manage him at [facility] ATTENDING:    Patient seen and examined at bedside. No acute events overnight. Breathing improved and patient without CP. Discussed with his OP LISSA Wren. Recent cardiac work up at [facility]. Will further manage him at [facility]

## 2023-04-17 NOTE — DISCHARGE NOTE PROVIDER - HOSPITAL COURSE
An 87 year old male, from [facility], ambulates with a cane, with PMHx of COPD on 2LPM O2 via nasal cannula, PPM, Chronic systolic HF, PPM, PVD, lymphedema, presented to the ED with worsening shortness of breath, associated with non-productive cough and whitish sputum, intermittent left sided chest pain radiating to his left axilla, and palpitations. No fever, headache, dizziness, abdominal pain, diarrhea, constipation. In the ED, VS was /66, HR 88, RR 20, T 100.7 (38.2), O2 92% on 2LPM via nasal cannula. Procal was 0.11.  No Leukocytosis noted. Troponin was negative. EKG showed atrial-sensed ventricular paced-rhythm. As per patient, PPM was last checked 3-4 months ago. He was given ASA, Zosyn and Aztreonam. Admitted to medicine for COPD exacerbation secondary to Pneumonia. Pulmonology was consulted and recommendations were appreciated. Patient was placed on DuoNebs, Prednisone, Tiotropium, PRN Albuterol, and IV antibiotics with Azithromycin and Ceftriaxone. He was also continued on Oxygen therapy. Azithromycin was discontinued after a negative Legionella and Mycoplasma (on RVP test) results and continued on Ceftriaxone. Also noted to have negative Covid-19 and HIV test. Preliminary Blood cultures were negative. Patient was continued on PO Lasix due to history of Chronic systolic heart failure. Pacemaker was interrogated and documented by NP.    Patient is stable for discharge per primary team and is advised to follow up with PCP as outpatient  Please refer to patient's complete medical chart with documents for a full hospital course, for this is only a brief summary.      An 87 year old male, from [facility], ambulates with a cane, with PMHx of COPD on 2LPM O2 via nasal cannula, PPM, Chronic systolic HF, PPM, PVD, lymphedema, presented to the ED with worsening shortness of breath, associated with non-productive cough and whitish sputum, intermittent left sided chest pain radiating to his left axilla, and palpitations. No fever, headache, dizziness, abdominal pain, diarrhea, constipation. In the ED, VS was /66, HR 88, RR 20, T 100.7 (38.2), O2 92% on 2LPM via nasal cannula. Procal was 0.11.  No Leukocytosis noted. Troponin was negative. EKG showed atrial-sensed ventricular paced-rhythm. As per patient, PPM was last checked 3-4 months ago. He was given ASA, Zosyn and Aztreonam. Admitted to medicine for COPD exacerbation secondary to Pneumonia. Pulmonology was consulted and recommendations were appreciated. Patient was placed on DuoNebs, Prednisone, Tiotropium, PRN Albuterol, and IV antibiotics with Azithromycin and Ceftriaxone. He was also continued on Oxygen therapy. Azithromycin was discontinued after a negative Legionella and Mycoplasma (on RVP test) results and continued on Ceftriaxone. Also noted to have negative Covid-19 and HIV test. Preliminary Blood cultures were negative. Patient was continued on PO Lasix due to history of Chronic systolic heart failure. Pacemaker was interrogated and documented by NP.    Patient is stable for discharge per primary team and is advised to follow up with PCP as outpatient  Please refer to patient's complete medical chart with documents for a full hospital course, for this is only a brief summary.      An 87 year old male, from [facility], ambulates with a cane, with PMHx of COPD on 2LPM O2 via nasal cannula, PPM, Chronic systolic HF, PPM, PVD, lymphedema, presented to the ED with worsening shortness of breath, associated with non-productive cough and whitish sputum, intermittent left sided chest pain radiating to his left axilla, and palpitations. No fever, headache, dizziness, abdominal pain, diarrhea, constipation. In the ED, VS was /66, HR 88, RR 20, T 100.7 (38.2), O2 92% on 2LPM via nasal cannula. Procal was 0.11.  No Leukocytosis noted. Troponin was negative. EKG showed atrial-sensed ventricular paced-rhythm. As per patient, PPM was last checked 3-4 months ago. He was given ASA, Zosyn and Aztreonam. Admitted to medicine for COPD exacerbation secondary to Pneumonia. Pulmonology was consulted and recommendations were appreciated. Patient was placed on DuoNebs, Prednisone, Tiotropium, PRN Albuterol, and IV antibiotics with Azithromycin and Ceftriaxone. He was also continued on Oxygen therapy. Azithromycin was discontinued after a negative Legionella and Mycoplasma (on RVP test) results and continued on Ceftriaxone. Also noted to have negative Covid-19 and HIV test. Preliminary Blood cultures were negative. Patient was continued on PO Lasix due to history of Chronic systolic heart failure. Pacemaker was interrogated and documented by NP.    Patient is stable for discharge per primary team and is advised to follow up with PCP as outpatient  Please refer to patient's complete medical chart with documents for a full hospital course, for this is only a brief summary.

## 2023-04-17 NOTE — DISCHARGE NOTE PROVIDER - ATTENDING DISCHARGE PHYSICAL EXAMINATION:
T(C): 36.5 (04-17-23 @ 12:11), Max: 36.7 (04-17-23 @ 05:23)  HR: 77 (04-17-23 @ 12:11) (73 - 87)  BP: 118/70 (04-17-23 @ 12:11) (95/59 - 157/76)  RR: 18 (04-17-23 @ 12:11) (17 - 18)  SpO2: 95% (04-17-23 @ 12:11) (95% - 98%)    GEN: male in NAD, appears comfortable, no diaphoresis  EYES: No scleral injection, PERRL, EOMI  ENTM: neck supple & symmetric without tracheal deviation, moist membranes, no gross hearing impairment, thyroid gland not enlarged  CV: +S1/S2, no m/r/g, no abdominal bruit, no LE edema  RESP: breathing comfortably, no respiratory accessory muscle use, +coarse breath sounds throughout  GI: normoactive BS, soft, NTND, no rebounding/guarding, no palpable masses  LYMPHATICS: no LAD or tenderness to palpation  NEURO: AOx3, no focal deficits, CNII-XII grossly intact  PSYCH: No SI/HI/AVH, appropriate affect, appropriate insight/judgment   SKIN: no petechiae, ecchymosis or maculopapular rash noted; +venous stasis LE

## 2023-04-17 NOTE — CONSULT NOTE ADULT - ASSESSMENT
87M former smoker from NH with PMH COPD on 2LNC who p/w increased dyspnea/cough/sputum/wheezing, f/w L retrocardiac opacity on CXR. Suspect pneumonia possibly exacerbating underlying COPD.    # L pneumonia  # COPD exacerbation  # Chronic hypoxemic respiratory failure on home O2  # HFrEF not clinically in overload      Recommendations:  - Agree with empiric tx for CAP; consider adding atypical coverage  - Agree with steroids, complete 5d course prednisone 50mg/d  - On d/c would c/w home Dulera 200/5 2 inh BID, rinsing mouth thoroughly after use  - C/w duonebs as needed  - Trial of 600-1200mg guaifenesin-ER standing BID for mucolytic effect  - Titrate supplemental O2 with goal SpO2 88-92%; monitor ambulatory/exertional SpO2 and document  - Incentive spirometry 10x/h or as tolerated; OOB/TC and ambulation daily as tolerated   - Can f/u as outpatient with our practice (Dr. Basilio, [address], [phone])        Anusha Basilio MD  Pulmonary & Critical Care  Available on Teams  87M former smoker from NH with PMH COPD on 2LNC who p/w increased dyspnea/cough/sputum/wheezing, f/w L retrocardiac opacity on CXR. Suspect pneumonia possibly exacerbating underlying COPD.    # L pneumonia  # COPD exacerbation  # Chronic hypoxemic respiratory failure on home O2  # HFrEF not clinically in overload      Recommendations:  - Agree with empiric tx for CAP; consider adding atypical coverage  - Agree with steroids, complete 5d course prednisone 50mg/d  - On d/c would c/w home Dulera 200/5 2 inh BID, rinsing mouth thoroughly after use  - C/w duonebs as needed  - Trial of 600-1200mg guaifenesin-ER standing BID for mucolytic effect  - Titrate supplemental O2 with goal SpO2 88-92%; monitor ambulatory/exertional SpO2 and document  - Incentive spirometry 10x/h or as tolerated; OOB/TC and ambulation daily as tolerated   - Can f/u as outpatient with our practice (Dr. Basilio, [address], [phone])        Anusha Basilio MD  Pulmonary & Critical Care  Available on Teams  87M former smoker from NH with PMH COPD on 2LNC who p/w increased dyspnea/cough/sputum/wheezing, f/w L retrocardiac opacity on CXR. Suspect pneumonia possibly exacerbating underlying COPD.    # L pneumonia  # COPD exacerbation  # Chronic hypoxemic respiratory failure on home O2  # HFrEF not clinically in overload      Recommendations:  - Agree with empiric tx for CAP; consider adding atypical coverage  - Agree with steroids, complete 5d course prednisone 50mg/d  - On d/c would c/w home Dulera 200/5 2 inh BID, rinsing mouth thoroughly after use  - C/w duonebs as needed  - Trial of 600-1200mg guaifenesin-ER standing BID for mucolytic effect  - Titrate supplemental O2 with goal SpO2 88-92%; monitor ambulatory/exertional SpO2 and document  - Incentive spirometry 10x/h or as tolerated; OOB/TC and ambulation daily as tolerated   - Can f/u as outpatient with our practice (Dr. Basilio, [address], [phone])        Anusha Basilio MD  Pulmonary & Critical Care  Available on Teams

## 2023-04-17 NOTE — CONSULT NOTE ADULT - TIME BILLING
- Review of chart (documentation, labs/studies, VS trends)  - Personal review of chest imaging  - Medication reconciliation  - Bedside interview and physical examination  - Bedside SpO2 monitoring and supplemental O2 titration  - Coordination of care with primary team

## 2023-04-17 NOTE — DISCHARGE NOTE PROVIDER - NSDCMRMEDTOKEN_GEN_ALL_CORE_FT
A+D topical ointment: Apply topically to affected area 2 times a day lower extremities  Ambien 5 mg oral tablet: 1 tab(s) orally once a day (at bedtime)  ammonium lactate 12% topical lotion: Apply topically to affected area 2 times a day lower extremities  atorvastatin 40 mg oral tablet: 1 tab(s) orally once a day (at bedtime)  Dulera 200 mcg-5 mcg/inh inhalation aerosol: 2 inhaled 2 times a day  ipratropium-albuterol 0.5 mg-2.5 mg/3 mL inhalation solution: 3 milliliter(s) by nebulizer every 6 hours as needed for  shortness of breath and/or wheezing  isosorbide mononitrate 60 mg oral tablet, extended release: 1 tab(s) orally once a day  Lasix 40 mg oral tablet: 1 tab(s) orally once a day  Protonix 40 mg oral delayed release tablet: 1 tab(s) orally once a day  Silvadene 1% topical cream: Apply topically to affected area once a day sacrum   A+D topical ointment: Apply topically to affected area 2 times a day lower extremities  Ambien 5 mg oral tablet: 1 tab(s) orally once a day (at bedtime)  ammonium lactate 12% topical lotion: Apply topically to affected area 2 times a day lower extremities  atorvastatin 40 mg oral tablet: 1 tab(s) orally once a day (at bedtime)  cefpodoxime 200 mg oral tablet: 1 tab(s) orally 2 times a day From 4/18/23 to 4/19/23  Dulera 200 mcg-5 mcg/inh inhalation aerosol: 2 inhaled 2 times a day  ipratropium-albuterol 0.5 mg-2.5 mg/3 mL inhalation solution: 3 milliliter(s) by nebulizer every 6 hours as needed for  shortness of breath and/or wheezing  isosorbide mononitrate 60 mg oral tablet, extended release: 1 tab(s) orally once a day  Lasix 40 mg oral tablet: 1 tab(s) orally once a day  predniSONE 20 mg oral tablet: 2 tab(s) orally every 24 hours From 4/18/23 to 4/19/23  Protonix 40 mg oral delayed release tablet: 1 tab(s) orally once a day  Silvadene 1% topical cream: Apply topically to affected area once a day sacrum

## 2023-04-20 LAB
CULTURE RESULTS: SIGNIFICANT CHANGE UP
SPECIMEN SOURCE: SIGNIFICANT CHANGE UP

## 2023-04-20 PROCEDURE — 83735 ASSAY OF MAGNESIUM: CPT

## 2023-04-20 PROCEDURE — 87449 NOS EACH ORGANISM AG IA: CPT

## 2023-04-20 PROCEDURE — 82553 CREATINE MB FRACTION: CPT

## 2023-04-20 PROCEDURE — 0225U NFCT DS DNA&RNA 21 SARSCOV2: CPT

## 2023-04-20 PROCEDURE — 82550 ASSAY OF CK (CPK): CPT

## 2023-04-20 PROCEDURE — 86703 HIV-1/HIV-2 1 RESULT ANTBDY: CPT

## 2023-04-20 PROCEDURE — 83690 ASSAY OF LIPASE: CPT

## 2023-04-20 PROCEDURE — 83880 ASSAY OF NATRIURETIC PEPTIDE: CPT

## 2023-04-20 PROCEDURE — 85025 COMPLETE CBC W/AUTO DIFF WBC: CPT

## 2023-04-20 PROCEDURE — 36415 COLL VENOUS BLD VENIPUNCTURE: CPT

## 2023-04-20 PROCEDURE — 84484 ASSAY OF TROPONIN QUANT: CPT

## 2023-04-20 PROCEDURE — 87899 AGENT NOS ASSAY W/OPTIC: CPT

## 2023-04-20 PROCEDURE — 96374 THER/PROPH/DIAG INJ IV PUSH: CPT

## 2023-04-20 PROCEDURE — 84145 PROCALCITONIN (PCT): CPT

## 2023-04-20 PROCEDURE — 96375 TX/PRO/DX INJ NEW DRUG ADDON: CPT

## 2023-04-20 PROCEDURE — 83605 ASSAY OF LACTIC ACID: CPT

## 2023-04-20 PROCEDURE — 93005 ELECTROCARDIOGRAM TRACING: CPT

## 2023-04-20 PROCEDURE — 82009 KETONE BODYS QUAL: CPT

## 2023-04-20 PROCEDURE — 99285 EMERGENCY DEPT VISIT HI MDM: CPT | Mod: 25

## 2023-04-20 PROCEDURE — 94640 AIRWAY INHALATION TREATMENT: CPT

## 2023-04-20 PROCEDURE — 81001 URINALYSIS AUTO W/SCOPE: CPT

## 2023-04-20 PROCEDURE — 87040 BLOOD CULTURE FOR BACTERIA: CPT

## 2023-04-20 PROCEDURE — 87635 SARS-COV-2 COVID-19 AMP PRB: CPT

## 2023-04-20 PROCEDURE — 71045 X-RAY EXAM CHEST 1 VIEW: CPT

## 2023-04-20 PROCEDURE — 83930 ASSAY OF BLOOD OSMOLALITY: CPT

## 2023-04-20 PROCEDURE — 84100 ASSAY OF PHOSPHORUS: CPT

## 2023-04-20 PROCEDURE — 80053 COMPREHEN METABOLIC PANEL: CPT

## 2023-04-20 PROCEDURE — 82803 BLOOD GASES ANY COMBINATION: CPT

## 2023-10-19 ENCOUNTER — DIAGNOSTICS ONLY (OUTPATIENT)
Dept: URBAN - METROPOLITAN AREA SURGICAL CENTER 72 | Facility: SURGICAL CENTER | Age: 88
End: 2023-10-19

## 2023-10-19 DIAGNOSIS — H25.13: ICD-10-CM

## 2023-10-19 PROCEDURE — 92136 OPHTHALMIC BIOMETRY: CPT

## 2023-12-19 RX ORDER — IPRATROPIUM/ALBUTEROL SULFATE 18-103MCG
3 AEROSOL WITH ADAPTER (GRAM) INHALATION
Refills: 0 | DISCHARGE

## 2023-12-19 RX ORDER — PANTOPRAZOLE SODIUM 20 MG/1
1 TABLET, DELAYED RELEASE ORAL
Refills: 0 | DISCHARGE

## 2023-12-19 RX ORDER — SALICYLIC ACID 0.5 %
1 CLEANSER (GRAM) TOPICAL
Refills: 0 | DISCHARGE

## 2023-12-19 RX ORDER — ISOSORBIDE MONONITRATE 60 MG/1
1 TABLET, EXTENDED RELEASE ORAL
Refills: 0 | DISCHARGE

## 2023-12-19 RX ORDER — MOMETASONE FUROATE AND FORMOTEROL FUMARATE DIHYDRATE 200; 5 UG/1; UG/1
2 AEROSOL RESPIRATORY (INHALATION)
Refills: 0 | DISCHARGE

## 2023-12-19 RX ORDER — FUROSEMIDE 40 MG
1 TABLET ORAL
Refills: 0 | DISCHARGE

## 2023-12-19 RX ORDER — SOD,AMMONIUM,POTASSIUM LACTATE
1 CREAM (GRAM) TOPICAL
Refills: 0 | DISCHARGE

## 2023-12-19 RX ORDER — ZOLPIDEM TARTRATE 10 MG/1
1 TABLET ORAL
Refills: 0 | DISCHARGE

## 2023-12-19 RX ORDER — ATORVASTATIN CALCIUM 80 MG/1
1 TABLET, FILM COATED ORAL
Refills: 0 | DISCHARGE